# Patient Record
Sex: MALE | Race: WHITE | NOT HISPANIC OR LATINO | Employment: OTHER | URBAN - METROPOLITAN AREA
[De-identification: names, ages, dates, MRNs, and addresses within clinical notes are randomized per-mention and may not be internally consistent; named-entity substitution may affect disease eponyms.]

---

## 2019-08-02 ENCOUNTER — HOSPITAL ENCOUNTER (EMERGENCY)
Facility: HOSPITAL | Age: 49
Discharge: LEFT BEFORE DISCHARGE | End: 2019-08-02
Attending: EMERGENCY MEDICINE | Admitting: EMERGENCY MEDICINE
Payer: MEDICARE

## 2019-08-02 VITALS
WEIGHT: 150 LBS | RESPIRATION RATE: 16 BRPM | HEIGHT: 64 IN | TEMPERATURE: 97.6 F | BODY MASS INDEX: 25.61 KG/M2 | HEART RATE: 79 BPM | DIASTOLIC BLOOD PRESSURE: 73 MMHG | OXYGEN SATURATION: 94 % | SYSTOLIC BLOOD PRESSURE: 110 MMHG

## 2019-08-02 DIAGNOSIS — F10.10 ALCOHOL ABUSE: Primary | ICD-10-CM

## 2019-08-02 LAB — ETHANOL EXG-MCNC: 0.12 MG/DL

## 2019-08-02 PROCEDURE — 99284 EMERGENCY DEPT VISIT MOD MDM: CPT

## 2019-08-02 PROCEDURE — 82075 ASSAY OF BREATH ETHANOL: CPT | Performed by: EMERGENCY MEDICINE

## 2019-08-02 PROCEDURE — 99282 EMERGENCY DEPT VISIT SF MDM: CPT | Performed by: EMERGENCY MEDICINE

## 2019-08-02 NOTE — ED NOTES
Patient eloped from ED, patient left ambulatory and steady on his feet  Provider aware and saw patient leaving        [facility]  08/02/19 9872

## 2019-08-02 NOTE — ED PROVIDER NOTES
History  Chief Complaint   Patient presents with    Alcohol Intoxication     Brought in by EMS  Pt intoxicated states "I drank alot"  Pt smells of alcohol  Patient is a 78-year-old male with a history of alcohol abuse who presents via ambulance a requesting rehab  Patient states that he just left [facility] [place] the hospital approximately 3 days ago after a 2 day stay  States item self out early to go drinking  Has been drinking for last 3 days  Has been drinking malt liquor cannot tell me how much she had today  Now requesting rehab  Patient does not know how he ended up here in [facility] is originally from [place]  Denies any suicidal homicidal ideations nor any auditory visual hallucinations  No history of any mental health issues  Requesting rehab at this time  Denies any other medical complaints  None       History reviewed  No pertinent past medical history  History reviewed  No pertinent surgical history  History reviewed  No pertinent family history  I have reviewed and agree with the history as documented  Social History     Tobacco Use    Smoking status: Current Every Day Smoker     Packs/day: 1 00     Types: Cigarettes    Smokeless tobacco: Never Used   Substance Use Topics    Alcohol use: Yes    Drug use: Not on file     Comment: "I just drank"  Review of Systems   Constitutional: Negative  HENT: Negative  Eyes: Negative  Respiratory: Negative  Cardiovascular: Negative  Gastrointestinal: Negative  Endocrine: Negative  Genitourinary: Negative  Musculoskeletal: Negative  Skin: Negative  Allergic/Immunologic: Negative  Neurological: Negative  Hematological: Negative  Psychiatric/Behavioral: Negative  Negative for behavioral problems and dysphoric mood  All other systems reviewed and are negative  Physical Exam  Physical Exam   Constitutional: He is oriented to person, place, and time     Patient smells of alcohol   HENT: Head: Normocephalic and atraumatic  Right Ear: External ear normal    Left Ear: External ear normal    Nose: Nose normal    Mouth/Throat: Oropharynx is clear and moist    Patient without any swelling, tenderness to palpation, no septal hematoma, no hemotympanum, no orbital tenderness to palpation, no TMJ tenderness, no malocclusion  Eyes: Pupils are equal, round, and reactive to light  Conjunctivae are normal    Neck: Normal range of motion  Neck supple  Cardiovascular: Normal rate, regular rhythm, normal heart sounds and intact distal pulses  Pulmonary/Chest: Effort normal and breath sounds normal    Abdominal: Soft  Bowel sounds are normal    Musculoskeletal: Normal range of motion  Neurological: He is alert and oriented to person, place, and time  Skin: Skin is warm and dry  Psychiatric: His affect is labile  His speech is delayed and slurred  Nursing note and vitals reviewed        Vital Signs  ED Triage Vitals [08/02/19 1746]   Temperature Pulse Respirations Blood Pressure SpO2   97 6 °F (36 4 °C) 79 16 110/73 94 %      Temp Source Heart Rate Source Patient Position - Orthostatic VS BP Location FiO2 (%)   Tympanic Monitor Lying Left arm --      Pain Score       No Pain           Vitals:    08/02/19 1746   BP: 110/73   Pulse: 79   Patient Position - Orthostatic VS: Lying         Visual Acuity      ED Medications  Medications - No data to display    Diagnostic Studies  Results Reviewed     Procedure Component Value Units Date/Time    POCT alcohol breath test [[id number]]  (Normal) Resulted:  08/02/19 1800    Lab Status:  Edited Result - FINAL Updated:  08/02/19 1817     EXTBreath Alcohol 0 120    Rapid drug screen, urine [[id number]]     Lab Status:  No result Specimen:  Urine                  No orders to display              Procedures  Procedures       ED Course  ED Course as of Aug 02 2005   Fri Aug 02, 2019   1851 Patient took the opportunity to elope while myself and security were busy with another patient trying to elope from the ambulance doors  MDM  Number of Diagnoses or Management Options  Alcohol abuse:      Amount and/or Complexity of Data Reviewed  Tests in the medicine section of CPT®: ordered and reviewed  Review and summarize past medical records: yes        Disposition  Final diagnoses:   Alcohol abuse     Time reflects when diagnosis was documented in both MDM as applicable and the Disposition within this note     Time User Action Codes Description Comment    8/2/2019  6:52 PM Spencer Bang Add [F10 10] Alcohol abuse       ED Disposition     ED Disposition Condition Date/Time Comment    Left from Room after Provider Exam  Fri Aug 2, 2019  6:52 PM       Follow-up Information    None         There are no discharge medications for this patient  No discharge procedures on file      ED Provider  Electronically Signed by           Bharati Dempsey MD  08/02/19 2005

## 2019-08-03 ENCOUNTER — HOSPITAL ENCOUNTER (EMERGENCY)
Facility: HOSPITAL | Age: 49
End: 2019-08-04
Attending: EMERGENCY MEDICINE | Admitting: EMERGENCY MEDICINE
Payer: MEDICARE

## 2019-08-03 DIAGNOSIS — R45.851 SUICIDAL IDEATION: ICD-10-CM

## 2019-08-03 DIAGNOSIS — Z09 FOLLOW-UP EXAM: ICD-10-CM

## 2019-08-03 DIAGNOSIS — F10.929 ALCOHOL INTOXICATION (HCC): Primary | ICD-10-CM

## 2019-08-03 LAB
AMPHETAMINES SERPL QL SCN: NEGATIVE
BARBITURATES UR QL: NEGATIVE
BENZODIAZ UR QL: POSITIVE
COCAINE UR QL: POSITIVE
ETHANOL EXG-MCNC: 0.09 MG/DL
ETHANOL EXG-MCNC: 0.17 MG/DL
METHADONE UR QL: NEGATIVE
OPIATES UR QL SCN: NEGATIVE
PCP UR QL: NEGATIVE
THC UR QL: NEGATIVE

## 2019-08-03 PROCEDURE — 99285 EMERGENCY DEPT VISIT HI MDM: CPT

## 2019-08-03 PROCEDURE — 99285 EMERGENCY DEPT VISIT HI MDM: CPT | Performed by: PHYSICIAN ASSISTANT

## 2019-08-03 PROCEDURE — 82075 ASSAY OF BREATH ETHANOL: CPT | Performed by: EMERGENCY MEDICINE

## 2019-08-03 PROCEDURE — 82075 ASSAY OF BREATH ETHANOL: CPT | Performed by: PHYSICIAN ASSISTANT

## 2019-08-03 PROCEDURE — 80307 DRUG TEST PRSMV CHEM ANLYZR: CPT | Performed by: PHYSICIAN ASSISTANT

## 2019-08-03 RX ORDER — NICOTINE 21 MG/24HR
21 PATCH, TRANSDERMAL 24 HOURS TRANSDERMAL ONCE
Status: COMPLETED | OUTPATIENT
Start: 2019-08-03 | End: 2019-08-04

## 2019-08-03 RX ORDER — ACETAMINOPHEN 325 MG/1
650 TABLET ORAL EVERY 6 HOURS PRN
Status: CANCELLED | OUTPATIENT
Start: 2019-08-03

## 2019-08-03 RX ORDER — THIAMINE MONONITRATE (VIT B1) 100 MG
100 TABLET ORAL
COMMUNITY
End: 2019-08-07 | Stop reason: HOSPADM

## 2019-08-03 RX ORDER — BENZTROPINE MESYLATE 1 MG/ML
2 INJECTION INTRAMUSCULAR; INTRAVENOUS EVERY 6 HOURS PRN
Status: CANCELLED | OUTPATIENT
Start: 2019-08-03

## 2019-08-03 RX ORDER — HALOPERIDOL 5 MG/ML
5 INJECTION INTRAMUSCULAR EVERY 8 HOURS PRN
Status: CANCELLED | OUTPATIENT
Start: 2019-08-03

## 2019-08-03 RX ORDER — HYDROXYZINE HYDROCHLORIDE 25 MG/1
25 TABLET, FILM COATED ORAL EVERY 6 HOURS PRN
Status: CANCELLED | OUTPATIENT
Start: 2019-08-03

## 2019-08-03 RX ORDER — MAGNESIUM HYDROXIDE/ALUMINUM HYDROXICE/SIMETHICONE 120; 1200; 1200 MG/30ML; MG/30ML; MG/30ML
15 SUSPENSION ORAL EVERY 4 HOURS PRN
Status: CANCELLED | OUTPATIENT
Start: 2019-08-03

## 2019-08-03 RX ORDER — ACETAMINOPHEN 325 MG/1
650 TABLET ORAL EVERY 4 HOURS PRN
Status: CANCELLED | OUTPATIENT
Start: 2019-08-03

## 2019-08-03 RX ORDER — BENZTROPINE MESYLATE 0.5 MG/1
2 TABLET ORAL EVERY 6 HOURS PRN
Status: CANCELLED | OUTPATIENT
Start: 2019-08-03

## 2019-08-03 RX ORDER — LEVOTHYROXINE SODIUM 0.12 MG/1
125 TABLET ORAL
COMMUNITY

## 2019-08-03 RX ORDER — IBUPROFEN 400 MG/1
800 TABLET ORAL EVERY 8 HOURS PRN
Status: CANCELLED | OUTPATIENT
Start: 2019-08-03

## 2019-08-03 RX ORDER — ACETAMINOPHEN 325 MG/1
325 TABLET ORAL EVERY 6 HOURS PRN
Status: CANCELLED | OUTPATIENT
Start: 2019-08-03

## 2019-08-03 RX ORDER — LORAZEPAM 1 MG/1
2 TABLET ORAL EVERY 4 HOURS PRN
Status: CANCELLED | OUTPATIENT
Start: 2019-08-03

## 2019-08-03 RX ORDER — RISPERIDONE 1 MG/1
1 TABLET, ORALLY DISINTEGRATING ORAL EVERY 12 HOURS PRN
Status: CANCELLED | OUTPATIENT
Start: 2019-08-03

## 2019-08-03 RX ORDER — CLONIDINE HYDROCHLORIDE 0.1 MG/1
0.1 TABLET ORAL
COMMUNITY
End: 2019-08-07 | Stop reason: HOSPADM

## 2019-08-03 RX ORDER — TRAZODONE HYDROCHLORIDE 100 MG/1
100 TABLET ORAL
COMMUNITY
End: 2019-08-07 | Stop reason: HOSPADM

## 2019-08-03 RX ORDER — GABAPENTIN 600 MG/1
600 TABLET ORAL
COMMUNITY
End: 2019-08-07 | Stop reason: HOSPADM

## 2019-08-03 RX ORDER — HALOPERIDOL 5 MG
5 TABLET ORAL EVERY 8 HOURS PRN
Status: CANCELLED | OUTPATIENT
Start: 2019-08-03

## 2019-08-03 RX ORDER — LORAZEPAM 1 MG/1
2 TABLET ORAL EVERY 4 HOURS PRN
Status: DISCONTINUED | OUTPATIENT
Start: 2019-08-03 | End: 2019-08-04 | Stop reason: HOSPADM

## 2019-08-03 RX ORDER — LITHIUM CARBONATE 300 MG/1
300 TABLET, FILM COATED, EXTENDED RELEASE ORAL
COMMUNITY
End: 2019-08-07 | Stop reason: HOSPADM

## 2019-08-03 RX ORDER — HYDROXYZINE 50 MG/1
50 TABLET, FILM COATED ORAL
COMMUNITY
End: 2019-08-07 | Stop reason: HOSPADM

## 2019-08-03 RX ADMIN — NICOTINE 21 MG: 21 PATCH TRANSDERMAL at 15:39

## 2019-08-03 NOTE — ED NOTES
Assumed care of pt at this time pt is in hospital issued scrubs no personal belongings at bedside,is sitting up in bed eating pretzels and drinking beverage  1:1 monitoring continued by ED tech see paper charting             Isela Moy RN  08/03/19 5931

## 2019-08-03 NOTE — ED NOTES
Pt ambulated to restroom and back with unsteady gait and is now in hallway shouting that he want his belonging and wants to leave        Jorge Heller RN  08/03/19 6246

## 2019-08-03 NOTE — ED PROVIDER NOTES
History  Chief Complaint   Patient presents with    Alcohol Intoxication     Pt brought in by [employer] for evaluation  Pt appears intoxicated reports recently relapsed in his sobriety reports he was been drinking beer for the last 3 days  Pt requesting medication detox stating "when I detox its really bad " Pt calm and cooperative  During suicide screening pt reports SI with a plan to jump off a bridge     49y  o male with PMH of bipolar presents to the ER for alcohol intoxication  History if very limited due to alcohol intoxication  Patient states he is here for alcohol detox  Patient states he was just at [facility] last week  He states he has been drinking today  He admits to drinking beer about a 6 pack  He states he normally drinks more than a 6 pack  Patient states he does withdraw from alcohol and does have seizures  He admits to [facility] telling him to kill himself  Patient admits to [facility] with a plan to jump off a bridge  He denies VH or HI  He denies fever, chills, chest pain, dyspnea, N/V/D, abdominal pain, weakness or paresthesias  Patient denies drug use but does admit to smoking 1 pack of cigarettes per day           History provided by:  Patient   used: No        Prior to Admission Medications   Prescriptions Last Dose Informant Patient Reported? Taking?    cloNIDine (CATAPRES) 0 1 mg tablet   Yes No   Sig: Take 0 1 mg by mouth   gabapentin (NEURONTIN) 600 MG tablet   Yes No   Sig: Take 600 mg by mouth   hydrOXYzine HCL (ATARAX) 50 mg tablet   Yes No   Sig: Take 50 mg by mouth   levothyroxine 125 mcg tablet   Yes No   Sig: Take 125 mcg by mouth   lithium carbonate (LITHOBID) 300 mg CR tablet   Yes No   Sig: Take 300 mg by mouth   thiamine (VITAMIN B1) 100 mg tablet   Yes No   Sig: Take 100 mg by mouth   traZODone (DESYREL) 100 mg tablet   Yes No   Sig: Take 100 mg by mouth      Facility-Administered Medications: None       Past Medical History:   Diagnosis Date    Bipolar 1 disorder ([facility] [address] ) History reviewed  No pertinent surgical history  History reviewed  No pertinent family history  I have reviewed and agree with the history as documented  Social History     Tobacco Use    Smoking status: Current Every Day Smoker     Packs/day: 1 00     Types: Cigarettes    Smokeless tobacco: Never Used   Substance Use Topics    Alcohol use: Yes    Drug use: Never     Comment: "I just drank"  Review of Systems   Constitutional: Negative for chills and fever  Eyes: Negative for redness  Respiratory: Negative for shortness of breath  Cardiovascular: Negative for chest pain  Gastrointestinal: Negative for abdominal pain, diarrhea, nausea and vomiting  Musculoskeletal: Negative for neck stiffness  Skin: Negative for rash  Allergic/Immunologic: Negative for food allergies  Neurological: Negative for weakness and numbness  Psychiatric/Behavioral: Positive for suicidal ideas  Physical Exam  Physical Exam   Constitutional: He is active  Non-toxic appearance  No distress  HENT:   Head: Normocephalic and atraumatic  Neck: Normal range of motion  Neck supple  No tracheal deviation present  Cardiovascular: Normal rate, regular rhythm, S1 normal, S2 normal and normal heart sounds  Exam reveals no gallop and no friction rub  No murmur heard  Pulmonary/Chest: Effort normal and breath sounds normal  No respiratory distress  He has no decreased breath sounds  He has no wheezes  He has no rhonchi  He has no rales  He exhibits no tenderness  Abdominal: Soft  Bowel sounds are normal  He exhibits no distension  There is no tenderness  There is no rebound and no guarding  Neurological: He is alert  GCS eye subscore is 4  GCS verbal subscore is 5  GCS motor subscore is 6  Skin: Skin is warm and dry  No rash noted  Psychiatric: He has a normal mood and affect  His behavior is normal  His speech is slurred (alcohol intoxication)  He expresses suicidal ideation   He expresses no homicidal ideation  Nursing note and vitals reviewed  Vital Signs  ED Triage Vitals   Temperature Pulse Respirations Blood Pressure SpO2   08/03/19 1417 08/03/19 1417 08/03/19 1417 08/03/19 1417 08/03/19 1417   98 4 °F (36 9 °C) 63 18 122/86 96 %      Temp Source Heart Rate Source Patient Position - Orthostatic VS BP Location FiO2 (%)   08/03/19 1417 08/03/19 1417 08/03/19 1833 08/03/19 1417 --   Oral Monitor Sitting Right arm       Pain Score       08/03/19 1417       No Pain           Vitals:    08/03/19 1417 08/03/19 1833 08/03/19 2117 08/04/19 0230   BP: 122/86 102/71 104/64 119/81   Pulse: 63 76 70 68   Patient Position - Orthostatic VS:  Sitting Lying Lying         Visual Acuity      ED Medications  Medications   nicotine (NICODERM CQ) 21 mg/24 hr TD 24 hr patch 21 mg (21 mg Transdermal Medication Applied 8/3/19 1539)   LORazepam (ATIVAN) tablet 2 mg (has no administration in time range)       Diagnostic Studies  Results Reviewed     Procedure Component Value Units Date/Time    POCT alcohol breath test [[id number]]  (Normal) Resulted:  08/03/19 1843    Lab Status:  Final result Updated:  08/03/19 1843     EXTBreath Alcohol 0 087    Rapid drug screen, urine [[id number]]  (Abnormal) Collected:  08/03/19 1553    Lab Status:  Final result Specimen:  Urine, Clean Catch Updated:  08/03/19 1622     Amph/Meth UR Negative     Barbiturate Ur Negative     Benzodiazepine Urine Positive     Cocaine Urine Positive     Methadone Urine Negative     Opiate Urine Negative     PCP Ur Negative     THC Urine Negative    Narrative:       Presumptive report  If requested, specimen will be sent to reference lab for confirmation  FOR MEDICAL PURPOSES ONLY  IF CONFIRMATION NEEDED PLEASE CONTACT THE LAB WITHIN 5 DAYS      Drug Screen Cutoff Levels:  AMPHETAMINE/METHAMPHETAMINES  1000 ng/mL  BARBITURATES     200 ng/mL  BENZODIAZEPINES     200 ng/mL  COCAINE      300 ng/mL  METHADONE      300 ng/mL  OPIATES      300 ng/mL  PHENCYCLIDINE     25 ng/mL  THC       50 ng/mL      POCT alcohol breath test [[id number]]  (Normal) Resulted:  08/03/19 1441    Lab Status:  Final result Updated:  08/03/19 1441     EXTBreath Alcohol 0 165                 No orders to display              Procedures  Procedures       ED Course                               MDM  Number of Diagnoses or Management Options  Alcohol intoxication ([facility] [address] ): new and requires workup  Suicidal ideation: new and requires workup  Diagnosis management comments: DDX consists of but not limited to: detox, alcohol intoxication, suicidal, bipolar    Will check BAT    BAT is  165 and possibly rising  Will recheck and reassess  Patient signed out to Dr Gala Campo awaiting to be seen by Crisis after he is sober  Patient stable at this time         Amount and/or Complexity of Data Reviewed  Clinical lab tests: ordered and reviewed  Discuss the patient with other providers: yes    Patient Progress  Patient progress: stable      Disposition  Final diagnoses:   Alcohol intoxication ([address] )   Suicidal ideation     Time reflects when diagnosis was documented in both MDM as applicable and the Disposition within this note     Time User Action Codes Description Comment    8/3/2019 10:02 PM Gwenette Meals Add [F10 929] Alcohol intoxication ([facility] [address] )     8/3/2019 10:02 PM Gwenette Meals Add [P23 900] Suicidal ideation     8/3/2019 11:21 PM [facility], [facility] [B27] Follow-up exam       ED Disposition     None      MD Documentation      Most Recent Value   Accepting Physician  Dr Nia Milian Name, [address]    (Name & Tel number)  Howard Macedo   Transported by [employer] and Unit #)  Cisco Andrews   [phone]   Sending MD Dr Charlie Mart[facility]nez Name, [facility] [address] [place] [address]    (Name & Tel number)  Cody Moody   Transport Mode  Ambulance   [facility] Unit [facility])  Cisco Andrews   [phone] Level of Care  Basic life support   Patient Belongings Disposition  Sent with patient   Transfer Date  08/04/19   Transfer Time  1543      Follow-up Information    None         Patient's Medications   Discharge Prescriptions    No medications on file     No discharge procedures on file      ED Provider  Electronically Signed by           Zonia Yates PA-C  08/04/19 2721

## 2019-08-04 ENCOUNTER — HOSPITAL ENCOUNTER (INPATIENT)
Facility: HOSPITAL | Age: 49
LOS: 3 days | Discharge: LEFT BEFORE DISCHARGE | DRG: 885 | End: 2019-08-07
Attending: PSYCHIATRY & NEUROLOGY | Admitting: PSYCHIATRY & NEUROLOGY
Payer: MEDICARE

## 2019-08-04 VITALS
OXYGEN SATURATION: 96 % | TEMPERATURE: 98.5 F | RESPIRATION RATE: 16 BRPM | DIASTOLIC BLOOD PRESSURE: 86 MMHG | SYSTOLIC BLOOD PRESSURE: 131 MMHG | HEART RATE: 50 BPM

## 2019-08-04 DIAGNOSIS — F10.929 ALCOHOL INTOXICATION (HCC): ICD-10-CM

## 2019-08-04 DIAGNOSIS — F31.9 BIPOLAR DISORDER (HCC): Primary | ICD-10-CM

## 2019-08-04 DIAGNOSIS — R45.851 SUICIDAL IDEATION: ICD-10-CM

## 2019-08-04 RX ORDER — ACETAMINOPHEN 325 MG/1
975 TABLET ORAL EVERY 6 HOURS PRN
Status: DISCONTINUED | OUTPATIENT
Start: 2019-08-04 | End: 2019-08-07 | Stop reason: HOSPADM

## 2019-08-04 RX ORDER — ACETAMINOPHEN 325 MG/1
650 TABLET ORAL EVERY 6 HOURS PRN
Status: DISCONTINUED | OUTPATIENT
Start: 2019-08-04 | End: 2019-08-07 | Stop reason: HOSPADM

## 2019-08-04 RX ORDER — ACETAMINOPHEN 325 MG/1
650 TABLET ORAL EVERY 4 HOURS PRN
Status: CANCELLED | OUTPATIENT
Start: 2019-08-04

## 2019-08-04 RX ORDER — THIAMINE MONONITRATE (VIT B1) 100 MG
100 TABLET ORAL
Status: DISCONTINUED | OUTPATIENT
Start: 2019-08-04 | End: 2019-08-07 | Stop reason: HOSPADM

## 2019-08-04 RX ORDER — ONDANSETRON 4 MG/1
4 TABLET, ORALLY DISINTEGRATING ORAL EVERY 6 HOURS PRN
Status: DISCONTINUED | OUTPATIENT
Start: 2019-08-04 | End: 2019-08-07 | Stop reason: HOSPADM

## 2019-08-04 RX ORDER — FOLIC ACID 1 MG/1
1 TABLET ORAL DAILY
Status: CANCELLED | OUTPATIENT
Start: 2019-08-04

## 2019-08-04 RX ORDER — HALOPERIDOL 5 MG/ML
5 INJECTION INTRAMUSCULAR EVERY 6 HOURS PRN
Status: CANCELLED | OUTPATIENT
Start: 2019-08-04

## 2019-08-04 RX ORDER — HYDROXYZINE HYDROCHLORIDE 25 MG/1
25 TABLET, FILM COATED ORAL EVERY 6 HOURS PRN
Status: CANCELLED | OUTPATIENT
Start: 2019-08-04

## 2019-08-04 RX ORDER — RISPERIDONE 1 MG/1
1 TABLET, ORALLY DISINTEGRATING ORAL
Status: CANCELLED | OUTPATIENT
Start: 2019-08-04

## 2019-08-04 RX ORDER — HYDROXYZINE HYDROCHLORIDE 25 MG/1
25 TABLET, FILM COATED ORAL ONCE
Status: COMPLETED | OUTPATIENT
Start: 2019-08-04 | End: 2019-08-04

## 2019-08-04 RX ORDER — TRAZODONE HYDROCHLORIDE 50 MG/1
50 TABLET ORAL
Status: CANCELLED | OUTPATIENT
Start: 2019-08-04

## 2019-08-04 RX ORDER — NICOTINE 21 MG/24HR
1 PATCH, TRANSDERMAL 24 HOURS TRANSDERMAL DAILY
Status: DISCONTINUED | OUTPATIENT
Start: 2019-08-05 | End: 2019-08-07 | Stop reason: HOSPADM

## 2019-08-04 RX ORDER — LORAZEPAM 2 MG/ML
2 INJECTION INTRAMUSCULAR EVERY 6 HOURS PRN
Status: DISCONTINUED | OUTPATIENT
Start: 2019-08-04 | End: 2019-08-06

## 2019-08-04 RX ORDER — ACETAMINOPHEN 325 MG/1
975 TABLET ORAL EVERY 6 HOURS PRN
Status: CANCELLED | OUTPATIENT
Start: 2019-08-04

## 2019-08-04 RX ORDER — HALOPERIDOL 5 MG
5 TABLET ORAL EVERY 6 HOURS PRN
Status: DISCONTINUED | OUTPATIENT
Start: 2019-08-04 | End: 2019-08-07 | Stop reason: HOSPADM

## 2019-08-04 RX ORDER — MAGNESIUM HYDROXIDE/ALUMINUM HYDROXICE/SIMETHICONE 120; 1200; 1200 MG/30ML; MG/30ML; MG/30ML
30 SUSPENSION ORAL EVERY 4 HOURS PRN
Status: DISCONTINUED | OUTPATIENT
Start: 2019-08-04 | End: 2019-08-07 | Stop reason: HOSPADM

## 2019-08-04 RX ORDER — LORAZEPAM 1 MG/1
2 TABLET ORAL ONCE
Status: COMPLETED | OUTPATIENT
Start: 2019-08-04 | End: 2019-08-04

## 2019-08-04 RX ORDER — RISPERIDONE 1 MG/1
1 TABLET, ORALLY DISINTEGRATING ORAL
Status: DISCONTINUED | OUTPATIENT
Start: 2019-08-04 | End: 2019-08-07 | Stop reason: HOSPADM

## 2019-08-04 RX ORDER — ACETAMINOPHEN 325 MG/1
650 TABLET ORAL EVERY 6 HOURS PRN
Status: CANCELLED | OUTPATIENT
Start: 2019-08-04

## 2019-08-04 RX ORDER — HYDROXYZINE HYDROCHLORIDE 25 MG/1
50 TABLET, FILM COATED ORAL EVERY 4 HOURS PRN
Status: DISCONTINUED | OUTPATIENT
Start: 2019-08-04 | End: 2019-08-07 | Stop reason: HOSPADM

## 2019-08-04 RX ORDER — THIAMINE MONONITRATE (VIT B1) 100 MG
100 TABLET ORAL
Status: CANCELLED | OUTPATIENT
Start: 2019-08-04

## 2019-08-04 RX ORDER — ONDANSETRON 4 MG/1
4 TABLET, ORALLY DISINTEGRATING ORAL ONCE
Status: COMPLETED | OUTPATIENT
Start: 2019-08-04 | End: 2019-08-04

## 2019-08-04 RX ORDER — HYDROXYZINE HYDROCHLORIDE 25 MG/1
50 TABLET, FILM COATED ORAL EVERY 4 HOURS PRN
Status: CANCELLED | OUTPATIENT
Start: 2019-08-04

## 2019-08-04 RX ORDER — HALOPERIDOL 5 MG
5 TABLET ORAL EVERY 6 HOURS PRN
Status: CANCELLED | OUTPATIENT
Start: 2019-08-04

## 2019-08-04 RX ORDER — LORAZEPAM 2 MG/ML
2 INJECTION INTRAMUSCULAR EVERY 6 HOURS PRN
Status: CANCELLED | OUTPATIENT
Start: 2019-08-04

## 2019-08-04 RX ORDER — GABAPENTIN 300 MG/1
600 CAPSULE ORAL 3 TIMES DAILY
Status: DISCONTINUED | OUTPATIENT
Start: 2019-08-04 | End: 2019-08-06

## 2019-08-04 RX ORDER — LORAZEPAM 1 MG/1
1 TABLET ORAL EVERY 4 HOURS PRN
Status: CANCELLED | OUTPATIENT
Start: 2019-08-04

## 2019-08-04 RX ORDER — FOLIC ACID 1 MG/1
1 TABLET ORAL DAILY
Status: DISCONTINUED | OUTPATIENT
Start: 2019-08-04 | End: 2019-08-07 | Stop reason: HOSPADM

## 2019-08-04 RX ORDER — GABAPENTIN 300 MG/1
600 CAPSULE ORAL 3 TIMES DAILY
Status: CANCELLED | OUTPATIENT
Start: 2019-08-04

## 2019-08-04 RX ORDER — MAGNESIUM HYDROXIDE/ALUMINUM HYDROXICE/SIMETHICONE 120; 1200; 1200 MG/30ML; MG/30ML; MG/30ML
30 SUSPENSION ORAL EVERY 4 HOURS PRN
Status: CANCELLED | OUTPATIENT
Start: 2019-08-04

## 2019-08-04 RX ORDER — HYDROXYZINE HYDROCHLORIDE 25 MG/1
25 TABLET, FILM COATED ORAL EVERY 6 HOURS PRN
Status: DISCONTINUED | OUTPATIENT
Start: 2019-08-04 | End: 2019-08-07 | Stop reason: HOSPADM

## 2019-08-04 RX ORDER — ACETAMINOPHEN 325 MG/1
650 TABLET ORAL EVERY 4 HOURS PRN
Status: DISCONTINUED | OUTPATIENT
Start: 2019-08-04 | End: 2019-08-07 | Stop reason: HOSPADM

## 2019-08-04 RX ORDER — OLANZAPINE 10 MG/1
10 INJECTION, POWDER, LYOPHILIZED, FOR SOLUTION INTRAMUSCULAR
Status: DISCONTINUED | OUTPATIENT
Start: 2019-08-04 | End: 2019-08-07 | Stop reason: HOSPADM

## 2019-08-04 RX ORDER — HALOPERIDOL 5 MG/ML
5 INJECTION INTRAMUSCULAR EVERY 6 HOURS PRN
Status: DISCONTINUED | OUTPATIENT
Start: 2019-08-04 | End: 2019-08-07 | Stop reason: HOSPADM

## 2019-08-04 RX ORDER — TRAZODONE HYDROCHLORIDE 50 MG/1
50 TABLET ORAL
Status: DISCONTINUED | OUTPATIENT
Start: 2019-08-04 | End: 2019-08-07 | Stop reason: HOSPADM

## 2019-08-04 RX ORDER — OLANZAPINE 10 MG/1
10 INJECTION, POWDER, LYOPHILIZED, FOR SOLUTION INTRAMUSCULAR
Status: CANCELLED | OUTPATIENT
Start: 2019-08-04

## 2019-08-04 RX ORDER — LORAZEPAM 1 MG/1
1 TABLET ORAL EVERY 4 HOURS PRN
Status: DISCONTINUED | OUTPATIENT
Start: 2019-08-04 | End: 2019-08-07 | Stop reason: HOSPADM

## 2019-08-04 RX ORDER — NICOTINE 21 MG/24HR
1 PATCH, TRANSDERMAL 24 HOURS TRANSDERMAL DAILY
Status: CANCELLED | OUTPATIENT
Start: 2019-08-04

## 2019-08-04 RX ADMIN — FOLIC ACID 1 MG: 1 TABLET ORAL at 17:00

## 2019-08-04 RX ADMIN — LORAZEPAM 2 MG: 1 TABLET ORAL at 12:09

## 2019-08-04 RX ADMIN — Medication 1 TABLET: at 17:00

## 2019-08-04 RX ADMIN — GABAPENTIN 600 MG: 300 CAPSULE ORAL at 16:59

## 2019-08-04 RX ADMIN — ONDANSETRON 4 MG: 4 TABLET, ORALLY DISINTEGRATING ORAL at 10:19

## 2019-08-04 RX ADMIN — ONDANSETRON 4 MG: 4 TABLET, ORALLY DISINTEGRATING ORAL at 21:29

## 2019-08-04 RX ADMIN — Medication 100 MG: at 20:49

## 2019-08-04 RX ADMIN — GABAPENTIN 600 MG: 300 CAPSULE ORAL at 20:49

## 2019-08-04 RX ADMIN — LORAZEPAM 1 MG: 1 TABLET ORAL at 16:59

## 2019-08-04 RX ADMIN — LORAZEPAM 2 MG: 2 INJECTION INTRAMUSCULAR; INTRAVENOUS at 20:57

## 2019-08-04 RX ADMIN — HYDROXYZINE HYDROCHLORIDE 25 MG: 25 TABLET ORAL at 10:19

## 2019-08-04 NOTE — PROGRESS NOTES
Applied 21 mg nicotine patch  Administered alcohol withdrawal protocol medications as prescribed  Pt states, "need something more for my withdrawals here I have seizure history" Administered ativan 1 mg po prn as prescribed  Pt irritable and agitates easily  Uncooperative with care  Refusing to participate in admission promise  Demanding to leave stating "I was drunk" that's only reason signed myself in  72 hour notice signed 8/4/19 at [phone]

## 2019-08-04 NOTE — ED NOTES
Insurance Authorization for admission:   Phone call placed to [facility]  Phone number: [phone]  Spoke to Hudson doherty  [facility] to fax paperwork upon discharge  EVS (Eligibility Verification System) called - [phone]  Automated system indicates: Medicare primary, [facility] secondary    Insurance Authorization for Transportation:    Not required for FARAZ Gloria  08/03/19   0702
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
## 2019-08-04 NOTE — ED NOTES
Patient is accepted at [facility]  Patient is accepted by Dr Sheree Joseph per Paula Warren in Intake  Transportation is arranged with [facility]  Transportation is scheduled for 1545  Patient may go to the floor at anytime  *Nurse report is to be called to [phone] prior to patient transfer  FARAZ Penny  08/03/19     0556

## 2019-08-04 NOTE — ED NOTES
RN called to pt's room  Pt requesting "something" for anxiety  Upon RN's arrival to pt's room, pt noted to have a tremoring hand while the hand was position on the side rail of the litter  During same interaction with pt, while speaking to pt about plan of care, pt's tremoring noted to cease  Pt offered breakfast tray again, but pt refused, stating that he "wouldn't be able to keep anything down " Pt reports that he is nauseous        Sherryle Court, RN  08/04/19 1017

## 2019-08-04 NOTE — ED CARE HANDOFF
Emergency Department Sign Out Note        Sign out and transfer of care from [facility]  See Separate Emergency Department note  The patient, Jose E Chapman, was evaluated by the previous provider for Alcohol intoxication  Workup Completed:  Alcohol level, drug screen  ED Course / Workup Pending (followup):  Please see detailed note by PAOLA SCHWAB and note below  ED Course as of Aug 04 1539   Sat Aug 03, 2019   2159 Blood Pressure: 104/64   2159 Pulse: 70   2159 Patient clinically sober and medically stable for inpatient psychiatric treatment  Signed 201  Respirations: 20     Procedures  MDM    Disposition  Final diagnoses:   Alcohol intoxication ([facility] [address] )   Suicidal ideation     Time reflects when diagnosis was documented in both MDM as applicable and the Disposition within this note     Time User Action Codes Description Comment    8/3/2019 10:02 PM Sundar Fabian Add [F10 929] Alcohol intoxication ([facility] [address] )     8/3/2019 10:02 PM Sundar Fabian Add [Z57 814] Suicidal ideation     8/3/2019 11:21 PM Reyne Riding Add [K59] Follow-up exam       ED Disposition     ED Disposition Condition Date/Time Comment    [place] [facility] [place] 4, 2019  3:07 PM Jose E Chapman should be transferred out to [facility] and has been medically cleared          MD Documentation      Most Recent Value   Patient Condition  The patient has been stabilized such that within reasonable medical probability, no material deterioration of the patient condition or the condition of the unborn child(nataliia) is likely to result from the transfer, Other (Include comment)_________________________________________ [psychiatric transfer]   Reason for Transfer  Level of Care needed not available at this facility, No bed available at level of patient's needs   Benefits of Transfer  Specialized equipment and/or services available at the receiving facility (Include comment)________________________, Continuity of care, Other benefits (Include comment)_______________________ [psychiatric transfer for continuity of care]   Risks of Transfer  Potential for delay in receiving treatment, Increased discomfort during transfer   Accepting Physician  Dr [address] Name, Severo Stevensva    (Name & Tel number)  Inessa Parent (Crisis)   Transported by (Company and Unit #)  [employer]   Sending MD Dr Leopold Dominion   Provider Certification  The patient is stable for psychiatric transfer because they are medically stable, and is protected from harming him/herself or others during transport      RN Documentation      [address] Name, Severo Diggs    (Name & Tel number)  Inessa Parent (Crisis)   Transport Mode  Ambulance   Transported by (Company and Unit #)  [employer]   Level of Care  Basic life support   Patient Belongings Disposition  Sent with patient   Transfer Date  08/04/19   Transfer Time  1545      Follow-up Information    None       Patient's Medications   Discharge Prescriptions    No medications on file     No discharge procedures on file         ED Provider  Electronically Signed by     Rebecca Price MD  08/04/19 26 [id number]

## 2019-08-04 NOTE — ED NOTES
Dr Mihai Dang aware of pt's complaints  Pt medicated as ordered/charted       Reno Ramírez, RN  08/04/19 2685

## 2019-08-04 NOTE — ED NOTES
Pt states "I just want to run and leave here " Informed pt he cannot do that because he signed a 201  Pt verbalized understanding   Security at pts bedside      Zander New RN  08/04/19 4763
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
## 2019-08-04 NOTE — ED NOTES
Pt requested soda, given  Tremors noted briefly in hand when holding cup  Pt denies AH/VH at this time  States "no but it's coming " pt laying still, resting in bed at this time        Zully Patiño, SOLITARIO  08/04/19 2242

## 2019-08-04 NOTE — ED NOTES
Pt is currently resting in bed   Tech Roselyn at bed side for 1:1     Florencio Jeans, SOLITARIO  08/04/19 7623

## 2019-08-04 NOTE — ED NOTES
Lithium bottle placed in valuables bag [id number] [facility] [address], RN  08/04/19 1862

## 2019-08-04 NOTE — ED NOTES
Pt states that he has not taken his daily medications "in a week or two " Dr Haylee Mondragon aware of same  No new medication orders at this time       Taylor Wang RN  08/04/19 5685

## 2019-08-04 NOTE — EMTALA/ACUTE CARE TRANSFER
Purific[facility]placed by [facility]  [address] [address]  Dept: [phone]      EMTALA TRANSFER CONSENT    NAME Tessa Taylor                                         1970                              MRN [id number]    I have been informed of my rights regarding examination, treatment, and transfer   by Dr Rebecca Smith, *    Benefits: Specialized equipment and/or services available at the receiving facility (Include comment)________________________, Continuity of care, Other benefits (Include comment)_______________________(psychiatric transfer for continuity of care)    Risks: Potential for delay in receiving treatment, Increased discomfort during transfer      Consent for Transfer:  I acknowledge that my medical condition has been evaluated and explained to me by the emergency department physician or other qualified medical person and/or my attending physician, who has recommended that I be transferred to the service of  Accepting Physician: Dr Koby Dukes at [facility] Name, [address] : Ruben Paredes  The above potential benefits of such transfer, the potential risks associated with such transfer, and the probable risks of not being transferred have been explained to me, and I fully understand them  The doctor has explained that, in my case, the benefits of transfer outweigh the risks  I agree to be transferred  I authorize the performance of emergency medical procedures and treatments upon me in both transit and upon arrival at the receiving facility  Additionally, I authorize the release of any and all medical records to the receiving facility and request they be transported with me, if possible  I understand that the safest mode of transportation during a medical emergency is an ambulance and that the Hospital advocates the use of this mode of transport   Risks of traveling to the receiving facility by car, including absence of medical control, life sustaining equipment, such as oxygen, and medical personnel has been explained to me and I fully understand them  (KATIANA CORRECT BOX BELOW)  [ X ]  I consent to the stated transfer and to be transported by ambulance/helicopter  [  ]  I consent to the stated transfer, but refuse transportation by ambulance and accept full responsibility for my transportation by car  I understand the risks of non-ambulance transfers and I exonerate the Hospital and its staff from any deterioration in my condition that results from this refusal     X___________________________________________    DATE  19  TIME________  Signature of patient or legally responsible individual signing on patient behalf           RELATIONSHIP TO PATIENT_________________________          Provider Certification    NAME Ebenezer Perez                                         1970                              MRN [id number]    A medical screening exam was performed on the above named patient  Based on the examination:    Condition Necessitating Transfer The primary encounter diagnosis was Alcohol intoxication ([facility] [address] )  Diagnoses of Suicidal ideation and Follow-up exam were also pertinent to this visit      Patient Condition: The patient has been stabilized such that within reasonable medical probability, no material deterioration of the patient condition or the condition of the unborn child(nataliia) is likely to result from the transfer, Other (Include comment)_________________________________________(psychiatric transfer)    Reason for Transfer: Level of Care needed not available at this facility, No bed available at level of patient's needs    Transfer Requirements: [facility]   · Space available and qualified personnel available for treatment as acknowledged by JUAN PABLO Davis [address] (Crisis)  · Agreed to accept transfer and to provide appropriate medical treatment as acknowledged by       Dr Tosha Jurado  · Appropriate medical records of the examination and treatment of the patient are provided at the time of transfer   [address] _______  · Transfer will be performed by qualified personnel from [facility]  and appropriate transfer equipment as required, including the use of necessary and appropriate life support measures  Provider Certification: I have examined the patient and explained the following risks and benefits of being transferred/refusing transfer to the patient/family:  The patient is stable for psychiatric transfer because they are medically stable, and is protected from harming him/herself or others during transport      Based on these reasonable risks and benefits to the patient and/or the unborn child(nataliia), and based upon the information available at the time of the patients examination, I certify that the medical benefits reasonably to be expected from the provision of appropriate medical treatments at another medical facility outweigh the increasing risks, if any, to the individuals medical condition, and in the case of labor to the unborn child, from effecting the transfer      X____________________________________________ DATE 08/04/19        TIME_______      ORIGINAL - SEND TO MEDICAL RECORDS   COPY - SEND WITH PATIENT DURING TRANSFER

## 2019-08-04 NOTE — ED NOTES
RN called to pt's room, where pt states that he wants to sign out  Pt made aware that 201 has been signed and he will not be able to leave this facility until the transport team arrives and he is transferred to [facility]  Pt agreeable to plan  Pt given breakfast tray as requested       Taylor Wang RN  08/04/19 6886

## 2019-08-05 PROBLEM — R79.89 ELEVATED LFTS: Status: ACTIVE | Noted: 2019-08-05

## 2019-08-05 PROBLEM — E03.8 OTHER SPECIFIED HYPOTHYROIDISM: Status: ACTIVE | Noted: 2019-08-05

## 2019-08-05 PROBLEM — F31.9 BIPOLAR DISORDER (HCC): Status: ACTIVE | Noted: 2019-08-05

## 2019-08-05 PROBLEM — F17.200 CURRENT SMOKER: Status: ACTIVE | Noted: 2019-08-05

## 2019-08-05 PROBLEM — F10.231 ALCOHOL WITHDRAWAL SYNDROME, WITH DELIRIUM (HCC): Status: ACTIVE | Noted: 2019-08-05

## 2019-08-05 PROBLEM — R00.1 BRADYCARDIA: Status: ACTIVE | Noted: 2019-08-05

## 2019-08-05 LAB
ALBUMIN SERPL BCP-MCNC: 3.6 G/DL (ref 3.5–5.7)
ALP SERPL-CCNC: 33 U/L (ref 40–150)
ALT SERPL W P-5'-P-CCNC: 70 U/L (ref 7–52)
ANION GAP SERPL CALCULATED.3IONS-SCNC: 2 MMOL/L (ref 4–13)
AST SERPL W P-5'-P-CCNC: 57 U/L (ref 13–39)
ATRIAL RATE: 48 BPM
BASOPHILS # BLD AUTO: 0 THOUSANDS/ΜL (ref 0–0.1)
BASOPHILS NFR BLD AUTO: 1 % (ref 0–2)
BILIRUB SERPL-MCNC: 0.4 MG/DL (ref 0.2–1)
BUN SERPL-MCNC: 16 MG/DL (ref 7–25)
CALCIUM SERPL-MCNC: 9.1 MG/DL (ref 8.6–10.5)
CHLORIDE SERPL-SCNC: 109 MMOL/L (ref 98–107)
CHOLEST SERPL-MCNC: 153 MG/DL (ref 0–200)
CO2 SERPL-SCNC: 26 MMOL/L (ref 21–31)
CREAT SERPL-MCNC: 0.79 MG/DL (ref 0.7–1.3)
EOSINOPHIL # BLD AUTO: 0.2 THOUSAND/ΜL (ref 0–0.61)
EOSINOPHIL NFR BLD AUTO: 4 % (ref 0–5)
ERYTHROCYTE [DISTWIDTH] IN BLOOD BY AUTOMATED COUNT: 13.3 % (ref 11.5–14.5)
GFR SERPL CREATININE-BSD FRML MDRD: 104 ML/MIN/1.73SQ M
GLUCOSE P FAST SERPL-MCNC: 103 MG/DL (ref 65–99)
GLUCOSE SERPL-MCNC: 103 MG/DL (ref 65–99)
HCT VFR BLD AUTO: 44.7 % (ref 42–47)
HDLC SERPL-MCNC: 40 MG/DL (ref 40–60)
HGB BLD-MCNC: 15.5 G/DL (ref 14–18)
LDLC SERPL CALC-MCNC: 74 MG/DL (ref 0–100)
LYMPHOCYTES # BLD AUTO: 2.8 THOUSANDS/ΜL (ref 0.6–4.47)
LYMPHOCYTES NFR BLD AUTO: 43 % (ref 21–51)
MCH RBC QN AUTO: 33.9 PG (ref 26–34)
MCHC RBC AUTO-ENTMCNC: 34.6 G/DL (ref 31–37)
MCV RBC AUTO: 98 FL (ref 81–99)
MONOCYTES # BLD AUTO: 0.6 THOUSAND/ΜL (ref 0.17–1.22)
MONOCYTES NFR BLD AUTO: 9 % (ref 2–12)
NEUTROPHILS # BLD AUTO: 2.9 THOUSANDS/ΜL (ref 1.4–6.5)
NEUTS SEG NFR BLD AUTO: 44 % (ref 42–75)
NONHDLC SERPL-MCNC: 113 MG/DL
P AXIS: 68 DEGREES
PLATELET # BLD AUTO: 127 THOUSANDS/UL (ref 149–390)
PMV BLD AUTO: 9 FL (ref 8.6–11.7)
POTASSIUM SERPL-SCNC: 3.8 MMOL/L (ref 3.5–5.5)
PR INTERVAL: 152 MS
PROT SERPL-MCNC: 6.8 G/DL (ref 6.4–8.9)
QRS AXIS: 38 DEGREES
QRSD INTERVAL: 98 MS
QT INTERVAL: 448 MS
QTC INTERVAL: 400 MS
RBC # BLD AUTO: 4.56 MILLION/UL (ref 4.3–5.9)
RPR SER QL: NORMAL
SODIUM SERPL-SCNC: 137 MMOL/L (ref 134–143)
T WAVE AXIS: 49 DEGREES
TRIGL SERPL-MCNC: 195 MG/DL (ref 44–166)
TSH SERPL DL<=0.05 MIU/L-ACNC: 1.83 UIU/ML (ref 0.45–5.33)
VENTRICULAR RATE: 48 BPM
WBC # BLD AUTO: 6.6 THOUSAND/UL (ref 4.8–10.8)

## 2019-08-05 PROCEDURE — 99223 1ST HOSP IP/OBS HIGH 75: CPT | Performed by: INTERNAL MEDICINE

## 2019-08-05 PROCEDURE — 93010 ELECTROCARDIOGRAM REPORT: CPT | Performed by: INTERNAL MEDICINE

## 2019-08-05 PROCEDURE — 99222 1ST HOSP IP/OBS MODERATE 55: CPT | Performed by: PSYCHIATRY & NEUROLOGY

## 2019-08-05 PROCEDURE — 84443 ASSAY THYROID STIM HORMONE: CPT | Performed by: NURSE PRACTITIONER

## 2019-08-05 PROCEDURE — 86592 SYPHILIS TEST NON-TREP QUAL: CPT | Performed by: NURSE PRACTITIONER

## 2019-08-05 PROCEDURE — 93005 ELECTROCARDIOGRAM TRACING: CPT

## 2019-08-05 PROCEDURE — 85025 COMPLETE CBC W/AUTO DIFF WBC: CPT | Performed by: NURSE PRACTITIONER

## 2019-08-05 PROCEDURE — 80053 COMPREHEN METABOLIC PANEL: CPT | Performed by: NURSE PRACTITIONER

## 2019-08-05 PROCEDURE — 80061 LIPID PANEL: CPT | Performed by: NURSE PRACTITIONER

## 2019-08-05 RX ORDER — CHLORDIAZEPOXIDE HYDROCHLORIDE 25 MG/1
50 CAPSULE, GELATIN COATED ORAL 3 TIMES DAILY
Status: DISCONTINUED | OUTPATIENT
Start: 2019-08-05 | End: 2019-08-06

## 2019-08-05 RX ORDER — LITHIUM CARBONATE 450 MG
450 TABLET, EXTENDED RELEASE ORAL EVERY 12 HOURS SCHEDULED
Status: DISCONTINUED | OUTPATIENT
Start: 2019-08-05 | End: 2019-08-07 | Stop reason: HOSPADM

## 2019-08-05 RX ORDER — LORAZEPAM 1 MG/1
2 TABLET ORAL EVERY 6 HOURS PRN
Status: DISCONTINUED | OUTPATIENT
Start: 2019-08-05 | End: 2019-08-06

## 2019-08-05 RX ADMIN — CHLORDIAZEPOXIDE HYDROCHLORIDE 50 MG: 25 CAPSULE ORAL at 21:43

## 2019-08-05 RX ADMIN — CHLORDIAZEPOXIDE HYDROCHLORIDE 50 MG: 25 CAPSULE ORAL at 11:34

## 2019-08-05 RX ADMIN — GABAPENTIN 600 MG: 300 CAPSULE ORAL at 08:18

## 2019-08-05 RX ADMIN — Medication 1 TABLET: at 08:18

## 2019-08-05 RX ADMIN — GABAPENTIN 600 MG: 300 CAPSULE ORAL at 21:43

## 2019-08-05 RX ADMIN — HALOPERIDOL 5 MG: 5 TABLET ORAL at 19:43

## 2019-08-05 RX ADMIN — NICOTINE 1 PATCH: 21 PATCH, EXTENDED RELEASE TRANSDERMAL at 08:18

## 2019-08-05 RX ADMIN — LORAZEPAM 2 MG: 1 TABLET ORAL at 12:12

## 2019-08-05 RX ADMIN — LORAZEPAM 2 MG: 1 TABLET ORAL at 17:26

## 2019-08-05 RX ADMIN — LITHIUM CARBONATE 450 MG: 450 TABLET, EXTENDED RELEASE ORAL at 21:43

## 2019-08-05 RX ADMIN — HYDROXYZINE HYDROCHLORIDE 50 MG: 25 TABLET ORAL at 15:54

## 2019-08-05 RX ADMIN — TRAZODONE HYDROCHLORIDE 50 MG: 50 TABLET ORAL at 22:22

## 2019-08-05 RX ADMIN — CHLORDIAZEPOXIDE HYDROCHLORIDE 50 MG: 25 CAPSULE ORAL at 15:54

## 2019-08-05 RX ADMIN — LORAZEPAM 2 MG: 2 INJECTION INTRAMUSCULAR; INTRAVENOUS at 19:43

## 2019-08-05 RX ADMIN — LITHIUM CARBONATE 450 MG: 450 TABLET, EXTENDED RELEASE ORAL at 12:12

## 2019-08-05 RX ADMIN — LORAZEPAM 2 MG: 2 INJECTION INTRAMUSCULAR; INTRAVENOUS at 06:12

## 2019-08-05 RX ADMIN — LEVOTHYROXINE SODIUM 125 MCG: 100 TABLET ORAL at 13:35

## 2019-08-05 RX ADMIN — HYDROXYZINE HYDROCHLORIDE 50 MG: 25 TABLET ORAL at 03:32

## 2019-08-05 RX ADMIN — HYDROXYZINE HYDROCHLORIDE 50 MG: 25 TABLET ORAL at 22:22

## 2019-08-05 RX ADMIN — FOLIC ACID 1 MG: 1 TABLET ORAL at 08:18

## 2019-08-05 RX ADMIN — GABAPENTIN 600 MG: 300 CAPSULE ORAL at 15:54

## 2019-08-05 RX ADMIN — Medication 100 MG: at 21:43

## 2019-08-05 NOTE — ASSESSMENT & PLAN NOTE
· Likely secondary to alcohol abuse and per records patient does have a history of chronic hepatitis-C  · No signs of acute liver failure  · Follow up with GI as outpatient

## 2019-08-05 NOTE — H&P
Psychiatric Evaluation - [address] 44 y o  male MRN: [id number]  Unit/Bed#: [facility] 254-01 Encounter: [id number]    Assessment/Plan   Principal Problem:    B[facility] disorder ([facility] )  Active Problems:    Alcohol withdrawal syndrome, with delirium ([facility] )    Current smoker    Bradycardia    Other specified hypothyroidism    Elevated LFTs    Plan:   Risks, benefits and possible side effects of Medications:   Risks, benefits, and possible side effects of medications explained to patient and patient verbalizes understanding  1  Admit to acute psychiatric level of care for safety and stability  2  CIWA protocol, Librium taper started, and Ativan p r n  For alcohol withdrawal symptoms  3  Safety precautions, and Q 7 minutes checks  4 individual, group and milieu therapy  5  Restart lithium  6  Consult Internal Medicine due to bradycardia  7  Discharge in aftercare planning is pending    Chief Complaint:  I was drunk and the police picked me up    History of Present Illness     Patient is a 44 y o  male presents on a 201 after he was brought to the emergency room by the police intoxicated  Patient who is unreliable historian reports he was walking down the street and the police picked him up  In the ER he has a blood alcohol level of 120, and was positive for cocaine and benzodiazepines  Patient seems very agitated and fixated on leaving today  Patient does report history of withdrawal seizures and DTs  He did report that he had to be in the ICU for withdrawal symptoms before  Patient is uncooperative, yelling, screaming and cursing  He did sign a 72 hour to withdraw from treatment      Psychiatric Review Of Systems:  sleep: no  appetite changes: yes  weight changes: yes  energy/anergy: yes  interest/pleasure/anhedonia: yes  somatic symptoms: yes  anxiety/panic: yes  paula: yes  guilty/hopeless: no  self injurious behavior/risky behavior: no    Historical Information     Past Psychiatric History: Dual drug/alcohol      Substance Abuse History:  Social History     Tobacco History     Smoking Status  Current Every Day Smoker Smoking Frequency  1 pack/day Smoking Tobacco Type  Cigarettes    Smokeless Tobacco Use  Never Used          Alcohol History     Alcohol Use Status  Yes Comment  case daily           Drug Use     Drug Use Status  Never Comment  "I just drank"  Sexual Activity     Sexually Active  Yes Partners  Female          Activities of Daily Living    Not Asked               Additional Substance Use Detail     Questions Responses    Cocaine frequency     Comment: positive on admission     Cocaine method Snort    Comment: Snort on 8/4/2019     Cocaine last use 8/3/19    Comment: 8/3/2019 on 8/4/2019     Last reviewed by Hannah Camacho RN on 8/4/2019        I have assessed this patient for substance use within the past 12 months      Past Medical History:   Diagnosis Date    Bipolar 1 disorder [facility])        Medical Review Of Systems:  Pertinent items are noted in HPI      Meds/Allergies   all current active meds have been reviewed  Allergies   Allergen Reactions    Shellfish-Derived Products      Seafood       Objective   Vital signs in last 24 hours:  Temp:  [97 5 °F (36 4 °C)-97 8 °F (36 6 °C)] 97 5 °F (36 4 °C)  HR:  [45-52] 50  Resp:  [16] 16  BP: (117-153)/(72-90) 132/72    No intake or output data in the 24 hours ending 08/05/19 1311    Mental Status Evaluation:  Appearance:  disheveled   Behavior:  psychomotor agitation   Speech:  loud   Mood:  angry and irritable   Affect:  labile   Language: repeating phrases   Thought Process:  circumstantial   Thought Content:  impoverished   Perceptual Disturbances: None   Risk Potential: Potential for Aggression No   Sensorium:  person and place   Cognition:  grossly intact   Consciousness:  awake    Attention: attention span and concentration were age appropriate   Intellect: not examined   Fund of Knowledge: vocabulary: limited   Insight: limited   Judgment: limited   Muscle Strength and Tone: face and neck   Gait/Station: slow   Motor Activity: no abnormal movements     Lab Results: I have personally reviewed pertinent lab results  I  Patient Strengths/Assets: compliant with medication    Patient Barriers/Limitations: substance abuse    Counseling / Coordination of Care  Total floor / unit time spent today 60 minutes  Greater than 50% of total time was spent with the patient and / or family counseling and / or coordination of care   A description of the counseling / coordination of care:

## 2019-08-05 NOTE — PROGRESS NOTES
Daily Rounds Documentation    Team Members Present:   MD Denver Van CRNP Donovan Penning, SOLITARIO Garland Steviecally, [address]    06 signed 8/4/19 1653  Positive for Cocaine and Benzo's  Alcoholic; drinks a case a day  Visible hand tremors  IM Ativan for irritability and anxiety  EKG needed for hx of DT

## 2019-08-05 NOTE — ASSESSMENT & PLAN NOTE
· Asymptomatic  · Will follow up EKG  · Possibly secondary to medications  · Avoid beta-blockers  · Follow up as outpatient with PCP

## 2019-08-05 NOTE — PROGRESS NOTES
Patient was asked by this [facility] staff to have vital signs assessed  Patient refused to have vital sign taken  Nursing staff made aware that patient would not allow to have vital signs taken

## 2019-08-05 NOTE — ASSESSMENT & PLAN NOTE
· TSH within normal limits  · Patient with history of noncompliance with medications  · Continue levothyroxine  · Follow up with PCP as outpatient for further management

## 2019-08-05 NOTE — PLAN OF CARE
Problem: Ineffective Coping  Goal: Cooperates with admission process  Description  Interventions:   - Complete admission process  Outcome: Progressing  Goal: Free from restraint events  Description  - Utilize least restrictive measures   - Provide behavioral interventions   - Redirect inappropriate behaviors   Outcome: Progressing     Problem: Risk for Self Injury/Neglect  Goal: Refrain from harming self  Description  Interventions:  - Monitor patient closely, per order  - Develop a trusting relationship  - Supervise medication ingestion, monitor effects and side effects   Outcome: Progressing     Problem: Ineffective Coping  Goal: Identifies ineffective coping skills  Outcome: Not Progressing  Goal: Identifies healthy coping skills  Outcome: Not Progressing  Goal: Demonstrates healthy coping skills  Outcome: Not Progressing  Goal: Participates in unit activities  Description  Interventions:  - Provide therapeutic environment   - Provide required programming   - Redirect inappropriate behaviors   Outcome: Not Progressing  Goal: Patient/Family participate in treatment and DC plans  Description  Interventions:  - Provide therapeutic environment  Outcome: Not Progressing  Goal: Patient/Family verbalizes awareness of resources  Outcome: Not Progressing  Goal: Understands least restrictive measures  Description  Interventions:  - Utilize least restrictive behavior  Outcome: Not Progressing     Problem: Risk for Self Injury/Neglect  Goal: Treatment Goal: Remain safe during length of stay, learn and adopt new coping skills, and be free of self-injurious ideation, impulses and acts at the time of discharge  Outcome: Not Progressing  Goal: Verbalize thoughts and feelings  Description  Interventions:  - Assess and re-assess patient's lethality and potential for self-injury  - Engage patient in 1:1 interactions, daily, for a minimum of 15 minutes  - Encourage patient to express feelings, fears, frustrations, hopes  - Establish rapport/trust with patient   Outcome: Not Progressing  Goal: Attend and participate in unit activities, including therapeutic, recreational, and educational groups  Description  Interventions:  - Provide therapeutic and educational activities daily, encourage attendance and participation, and document same in the medical record  - Obtain collateral information, encourage visitation and family involvement in care   Outcome: Not Progressing  Goal: Recognize maladaptive responses and adopt new coping mechanisms  Outcome: Not Progressing  Goal: Complete daily ADLs, including personal hygiene independently, as able  Description  Interventions:  - Observe, teach, and assist patient with ADLS  - Monitor and promote a balance of rest/activity, with adequate nutrition and elimination  Outcome: Not Progressing     Problem: Anxiety  Goal: Anxiety is at manageable level  Description  Interventions:  - Assess and monitor patient's anxiety level  - Monitor for signs and symptoms of anxiety both physical and emotional (heart palpitations, chest pain, shortness of breath, headaches, nausea, feeling jumpy, restlessness, irritable, apprehensive)  - Collaborate with interdisciplinary team and initiate plan and interventions as ordered    - [place] patient to unit/surroundings  - Explain treatment plan  - Encourage participation in care  - Encourage verbalization of concerns/fears  - Identify coping mechanisms  - Assist in developing anxiety-reducing skills  - Administer/offer alternative therapies  - Limit or eliminate stimulants  Outcome: Not Progressing     Problem: Individualized Interventions  Goal: Patient will verbalize appropriate use of telephone within 5 days  Description  Interventions:  - Treatment team to determine use of supervised phone privileges   Outcome: Not Progressing  Goal: Patient will verbalize need for hospitalization and will no longer attempt elopement within 5 days  Description  Interventions:  - Ongoing education to help patient understand need for hospitalization  Outcome: Not Progressing  Goal: Patient will recognize inappropriate behaviors and develop alternative behaviors within 5 days  Description  Interventions:  - Patient in collaboration with Treatment Team will develop a behavior management plan to help identify effective coping skills to deal with stressors  Outcome: Not Progressing     Problem: Ineffective Coping  Goal: Participates in unit activities  Description  Interventions:  - Provide therapeutic environment   - Provide required programming   - Redirect inappropriate behaviors   Outcome: Not Progressing

## 2019-08-05 NOTE — PROGRESS NOTES
Assumed care of this patient from prior shift RN on 8/4/19 at approximately 2300  Patient remains in bed sleeping at this time  He appears comfortable, lying on his left side; no obvious signs of distress observed  His breathing is easy and non-labored on room air  Respirations 16  Will CTM via q7 minute safety checks

## 2019-08-05 NOTE — PROGRESS NOTES
Patient awoke and ambulated to the nurses station informing he feels anxious  He rates his anxiety 8/10  Amaya Scale performed with a result of 18  Administered PRN Atarax as per order for moderate anxiety  Will monitor for medication effectiveness       Edited to include VS:  45-/86

## 2019-08-05 NOTE — PROGRESS NOTES
Pt has been withdrawn to quiet room per his request  Pt did come out for meds and breakfast  Pt is irritable and demanding his street clothing so he can leave  Explained to pt that he is not scheduled to DC at this time and must speak with the doctor  moderate hand tremors noted with arms extended  Reports mild nausea, no vomiting  Pt states he is having "a little bit " of A/V hallucinations, but does not elaborate on what they are  CIWA assessed at 15  Denies current SI, HI  Monitoring continues

## 2019-08-05 NOTE — CONSULTS
Consult- Tessa Taylor 1970, 44 y o  male MRN: [id number]    Unit/Bed#: [facility] 254-01 Encounter: [id number]    Primary Care Provider: No primary care provider on file  Date and time admitted to hospital: 8/4/2019  4:43 PM      Inpatient consult to Internal Medicine  Consult performed by: Brent James MD  Consult ordered by: ROSALINO Pratt        Alcohol withdrawal syndrome, with delirium ([facility] [address] )  Assessment & Plan  · Continue Librium taper  · Ativan as needed  · Folate, thiamine, MVI  · Patient currently appears to be stable  · Counseled on alcohol cessation  · Case management evaluation for potential rehab options after cleared from psychiatry team    Bradycardia  Assessment & Plan  · Asymptomatic  · Will follow up EKG  · Possibly secondary to medications  · Avoid beta-blockers  · Follow up as outpatient with PCP    Other specified hypothyroidism  Assessment & Plan  · TSH within normal limits  · Patient with history of noncompliance with medications  · Continue levothyroxine  · Follow up with PCP as outpatient for further management    Elevated LFTs  Assessment & Plan  · Likely secondary to alcohol abuse and per records patient does have a history of chronic hepatitis-C  · No signs of acute liver failure  · Follow up with GI as outpatient    Current smoker  Assessment & Plan  · Continue nicotine patch  · Counseled smoking cessation    * Bipolar disorder [facility])  Assessment & Plan  · Management per psychiatry team    Recommendations for Discharge:  · Per Primary Service      History of Present Illness:  Tessa Taylor is a 44 y o  male who is originally admitted to the psychiatry service due to alcoholism and bipolar disorder  We are consulted for medical management/evaluation  Patient currently in [address] unit due to suicidal ideation and alcohol intoxication  Patient currently on Librium taper as well as Ativan as needed    Patient has been having intermittent agitation and hallucinations while in the [facility] unit  Patient was noted to be bradycardic and EKG has been ordered  Patient denies any chest pain or shortness of breath  Patient does have a history of hypothyroidism however he states that he has not been taking his medications regularly  Patient denies any constipation  Patient complaining of nausea however he was able to tolerate lunch this afternoon  No vomiting  No fever or chills  Review of Systems:  Review of Systems   Constitutional: Negative for chills and fever  Respiratory: Negative for cough, chest tightness and shortness of breath  Cardiovascular: Negative for chest pain and leg swelling  Gastrointestinal: Positive for nausea  Negative for abdominal distention, abdominal pain, blood in stool and diarrhea  Genitourinary: Negative for frequency  Musculoskeletal: Negative for back pain  Psychiatric/Behavioral: Positive for agitation, behavioral problems and hallucinations  All other systems reviewed and are negative  Past Medical and Surgical History:   Past Medical History:   Diagnosis Date    Bipolar 1 disorder ([facility] [address] )        No past surgical history on file  Meds/Allergies:  all medications and allergies reviewed    Allergies: Allergies   Allergen Reactions    Shellfish-Derived Products      Seafood       Social History:     Marital Status: Single    Substance Use History:   Social History     Substance and Sexual Activity   Alcohol Use Yes    Frequency: 4 or more times a week    Drinks per session: 10 or more    Binge frequency: Daily or almost daily    Comment: case daily      Social History     Tobacco Use   Smoking Status Current Every Day Smoker    Packs/day: 1 00    Types: Cigarettes   Smokeless Tobacco Never Used     Social History     Substance and Sexual Activity   Drug Use Never    Comment: "I just drank"          Family History:  Patient denies any history of malignancies in his immediate family    Physical Exam:   Vitals: Blood Pressure: 132/72 (08/05/19 0900)  Pulse: (!) 50 (08/05/19 0900)  Temperature: 97 5 °F (36 4 °C) (08/05/19 0750)  Temp Source: Temporal (08/05/19 0750)  Respirations: 16 (08/05/19 0750)  Height: 5' 8" (172 7 cm) (08/04/19 1648)  Weight - Scale: 67 1 kg (148 lb) (08/04/19 1648)  SpO2: 97 % (08/05/19 0750)    Physical Exam   Constitutional: He appears well-developed  No distress  HENT:   Head: Normocephalic and atraumatic  Eyes: Conjunctivae and EOM are normal    Neck: Normal range of motion  Neck supple  Cardiovascular: Regular rhythm  Bradycardia present  Pulmonary/Chest: Effort normal  No respiratory distress  Abdominal: Soft  He exhibits no distension  There is no tenderness  Musculoskeletal: Normal range of motion  He exhibits no edema  Neurological: He is alert  No cranial nerve deficit  Coordination normal    Skin: Skin is warm and dry  Psychiatric:   Intermittent agitation  Currently with normal behavior       Additional Data:   Lab Results: I have personally reviewed pertinent reports  Results from last 7 days   Lab Units 08/05/19  0552   WBC Thousand/uL 6 60   HEMOGLOBIN g/dL 15 5   HEMATOCRIT % 44 7   PLATELETS Thousands/uL 127*   NEUTROS PCT % 44   LYMPHS PCT % 43   MONOS PCT % 9   EOS PCT % 4     Results from last 7 days   Lab Units 08/05/19  0552   POTASSIUM mmol/L 3 8   CHLORIDE mmol/L 109*   CO2 mmol/L 26   BUN mg/dL 16   CREATININE mg/dL 0 79   CALCIUM mg/dL 9 1   ALK PHOS U/L 33*   ALT U/L 70*   AST U/L 57*             No results found for: HGBA1C        Imaging: I have personally reviewed pertinent reports  No orders to display       EKG, Pathology, and Other Studies Reviewed on Admission:   · EKG: Will follow up    ** Please Note: This note has been constructed using a voice recognition system   **

## 2019-08-05 NOTE — ASSESSMENT & PLAN NOTE
· Continue Librium taper  · Ativan as needed  · Folate, thiamine, MVI  · Patient currently appears to be stable  · Counseled on alcohol cessation  · Case management evaluation for potential rehab options after cleared from psychiatry team

## 2019-08-05 NOTE — PROGRESS NOTES
This writer approached patient while he was standing at the nurses station verbally abusing staff and threatening an outburst, refusing to redirect from abusive and intrusive behaviors  Pt behavior/presentation is similar to that of last Friday  Pt is demanding discharge or stating he will "continue to act like an asshole"  Pt was encouraged to discuss frustrations/situation that are leading to his current behavior  Pt refuses to process with staff when offered  Instead, pt tests limits with staff and demands food/drink,  asks to be restraints or "knocked out"  Pt ultimately ended discussion by walking away to room and stating "fuck you" under his breath  Patient has been resting quietly in bed since

## 2019-08-05 NOTE — PROGRESS NOTES
This writer approached pt in [facility] near nurses [facility], while he was yelling and cursing and knocking his fist on the window  Pt initially declined to talk but then requested to talk in the quiet room  When provided the opportunity to express frustrations, pt shared he goes through alcohol withdrawal frequently at [facility] where they always "put him down" in the ICU and because of this, he feels his withdrawal isn't being properly managing now  This writer reassured patient that the team is capable of managing his withdrawal despite the difference in approach  Scheduled librium taper and availability of PRN ativan reviewed with patient  Pt was attentive but remained agitated  Pt was redirected to discuss goals  Pt initially said, "to get the fuck out of this place" but with further discussion informed he would like IP rehab  This writer discussed how a 72hr notice may affect treatment  Pt ignored such and instead requested more ativan  Medication regimen was again reviewed  No further outbursts/concerns noted at this time

## 2019-08-05 NOTE — PROGRESS NOTES
Pt awake and out of bed after 1115, demanded to see MD  Agreeable to taking librium, but angry that he is here  Yelling loudly, making threats if he doesn't get to leave  Believes this is senior care and that he is here for being drunk

## 2019-08-05 NOTE — PROGRESS NOTES
PRN anxiolytic appears to be effective as patient is currently in bed sleeping  He appears to be comfortable (lying on his right side) and in no acute distress  Respirations 14  Breathing continues to be easy and non-labored on room air  CIWA scoring performed this am 0247 with a result of 3  Will CTM via q7 minute safety checks
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
## 2019-08-05 NOTE — PROGRESS NOTES
Pt was given PRN Ativan 2 mg PO @ 1212  Pt was able to remain controlled and his agitation level started to decrease  Pt agrees to have EKG done and would like help finding a rehab

## 2019-08-05 NOTE — TREATMENT PLAN
Treatment Plan Finn [age] y o  male MRN: [id number]    [facility] [id number] Encounter: [id number]          Admit Date/Time:  8/4/2019  4:43 PM    Treatment Team: Attending Provider: Daria Tyson MD; Patient Care Assistant: Blanka Landeros; Patient Care Assistant: Jf Huynh; Patient Care Assistant: Sylvester Mcgee; Patient Care Technician: Suzette Lopez; Patient Care Assistant: Shellie Burciaga; Patient Care Technician: Elder Stanton; Recreational Therapist: Fantasma Castro; :  [facility]; Registered Nurse: Angie Adam RN; Consulting Physician: Akil Shelby MD    Diagnosis: Principal Problem:    Bipolar disorder [facility])  Active Problems:    Alcohol withdrawal syndrome, with delirium ([facility] [address] )    Current smoker    Bradycardia    Other specified hypothyroidism    Elevated LFTs      Mental Status Evaluation:  Appearance:  casually dressed   Behavior:  uncooperative   Speech:  loud   Mood:  angry   Affect:  increased in intensity   Language: repeating phrases   Thought Process:  illogical   Thought Content:  obsessions   Perceptual Disturbances: None   Risk Potential: Potential for Aggression No   Sensorium:  person   Cognition:  grossly intact   Consciousness:  awake    Attention: attention span appeared shorter than expected for age   Intellect: not examined   Fund of Knowledge: vocabulary: limited   Insight:  limited   Judgment: limited   Muscle Location: face and neck   Gait/Station: normal balance   Motor Activity: no abnormal movements     Patient Strengths: compliant with medication     Patient Barriers: substance abuse    Progress Toward Goals: ongoing    Recommended Treatment: discharge planning     Treatment Frequency: 2-3 times per week     Expected Discharge Date: 8/4/2019    Discharge Plan: referrals as indicated     Treatment Plan Created/Updated By: William Harding MD

## 2019-08-05 NOTE — PROGRESS NOTES
[phone]  Patient's mood is labile  Quickly became irritable and reported withdrawal symptoms and his CIWA was found to be 24  When offered PRN Ativan, patient began arguing with this RN and stated "1mg of Ativan isn't going to keep me from having seizures," and refused  IM Ativan given instead for anxiety and withdrawal symptoms  Patient was then more controlled and able to speak to staff appropriately  Reported nausea and Zofran SL tablets ordered and administered to patient  He reported relief  No complaints of pain  Will continue monitoring

## 2019-08-06 PROCEDURE — 99231 SBSQ HOSP IP/OBS SF/LOW 25: CPT | Performed by: NURSE PRACTITIONER

## 2019-08-06 RX ORDER — GABAPENTIN 400 MG/1
800 CAPSULE ORAL 3 TIMES DAILY
Status: DISCONTINUED | OUTPATIENT
Start: 2019-08-06 | End: 2019-08-07 | Stop reason: HOSPADM

## 2019-08-06 RX ORDER — CHLORDIAZEPOXIDE HYDROCHLORIDE 25 MG/1
25 CAPSULE, GELATIN COATED ORAL 3 TIMES DAILY
Status: DISCONTINUED | OUTPATIENT
Start: 2019-08-06 | End: 2019-08-06

## 2019-08-06 RX ORDER — CHLORDIAZEPOXIDE HYDROCHLORIDE 5 MG/1
10 CAPSULE, GELATIN COATED ORAL 3 TIMES DAILY
Status: DISCONTINUED | OUTPATIENT
Start: 2019-08-06 | End: 2019-08-07 | Stop reason: HOSPADM

## 2019-08-06 RX ADMIN — LITHIUM CARBONATE 450 MG: 450 TABLET, EXTENDED RELEASE ORAL at 21:08

## 2019-08-06 RX ADMIN — GABAPENTIN 800 MG: 400 CAPSULE ORAL at 21:08

## 2019-08-06 RX ADMIN — FOLIC ACID 1 MG: 1 TABLET ORAL at 10:30

## 2019-08-06 RX ADMIN — GABAPENTIN 800 MG: 400 CAPSULE ORAL at 16:42

## 2019-08-06 RX ADMIN — LITHIUM CARBONATE 450 MG: 450 TABLET, EXTENDED RELEASE ORAL at 10:30

## 2019-08-06 RX ADMIN — CHLORDIAZEPOXIDE HYDROCHLORIDE 50 MG: 25 CAPSULE ORAL at 10:29

## 2019-08-06 RX ADMIN — NICOTINE 1 PATCH: 21 PATCH, EXTENDED RELEASE TRANSDERMAL at 09:55

## 2019-08-06 RX ADMIN — CHLORDIAZEPOXIDE HYDROCHLORIDE 10 MG: 5 CAPSULE ORAL at 21:08

## 2019-08-06 RX ADMIN — Medication 1 TABLET: at 10:29

## 2019-08-06 RX ADMIN — CHLORDIAZEPOXIDE HYDROCHLORIDE 10 MG: 5 CAPSULE ORAL at 16:42

## 2019-08-06 RX ADMIN — GABAPENTIN 600 MG: 300 CAPSULE ORAL at 10:30

## 2019-08-06 RX ADMIN — LORAZEPAM 1 MG: 1 TABLET ORAL at 19:06

## 2019-08-06 RX ADMIN — Medication 100 MG: at 21:08

## 2019-08-06 NOTE — PLAN OF CARE
PT continues to decline all invites to attend offered art therapy groups and is most often observed isolating in pt room  PT displays a depressed mood and affect, with fleeting eye contact  PT does answer appropriately when prompted, however does appear unmotivated in his treatment and recovery  PT does have some positive social interactions with select peers  PT will attend at least 50% of art therapy groups to provide for relaxation and to allow for creative expression of feelings and emotions       Problem: Ineffective Coping  Goal: Participates in unit activities  Description  Interventions:  - Provide therapeutic environment   - Provide required programming   - Redirect inappropriate behaviors   Outcome: Not Progressing

## 2019-08-06 NOTE — PROGRESS NOTES
Progress Note - Behavioral Health     Armando Gosselin 44 y o  male MRN: [id number]   Unit/Bed#: [facility] 254-01 Encounter: [id number]    Behavior over the last 24 hours:    Leeanne Pacheco was seen for an inpatient follow-up psychiatric visit this date  At today's visit, he appeared sedated and was stumbling and having difficulty walking  He undressed in the office during assessment to change clothes  He is taking his medications as prescribed  He remains discharge focused and agitated regarding being in the hospital   His behaviors remain controlled at this time  He signed a 72 hour notice  ROS: no complaints    Mental Status Evaluation:    Appearance:  disheveled, marginal hygiene   Behavior:  demanding, inappropriate   Speech:  normal rate and volume   Mood:  labile   Affect:  brighter   Thought Process:  tangential   Associations: concrete associations   Thought Content:  no overt delusions   Perceptual Disturbances: none   Risk Potential: Suicidal ideation - None  Homicidal ideation - None  Potential for aggression - No   Sensorium:  oriented to person, place and time/date   Memory:  recent and remote memory grossly intact   Consciousness:  alert and awake   Attention: poor concentration and poor attention span   Insight:  poor   Judgment: poor   Gait/Station: normal gait/station, normal balance   Motor Activity: no abnormal movements     Vital signs in last 24 hours:    Temp:  [97 8 °F (36 6 °C)] 97 8 °F (36 6 °C)  HR:  [47] 47  Resp:  [16] 16  BP: (97)/(57) 97/57    Laboratory results:  I have personally reviewed all pertinent laboratory/tests results  Progress Toward Goals: progressing    Assessment/Plan   Principal Problem:    Bipolar disorder ([facility] )  Active Problems:    Alcohol withdrawal syndrome, with delirium ([facility] )    Current smoker    Bradycardia    Other specified hypothyroidism    Elevated LFTs    Recommended Treatment:   Continue current medications  Continue to monitor    Discharge disposition and planning are ongoing  All current active medications have been reviewed  Encourage group therapy, milieu therapy and occupational therapy  Behavioral Health checks every 7 minutes      Current Facility-Administered Medications:  acetaminophen 650 mg Oral Q6H PRN Yadi Montejo, CRNP   acetaminophen 650 mg Oral Q4H PRN Yadi Montejo, CRNP   acetaminophen 975 mg Oral Q6H PRN Yadi Montejo, CRNP   aluminum-magnesium hydroxide-simethicone 30 mL Oral Q4H PRN Yadi Montejo, CRNP   chlordiazePOXIDE 25 mg Oral TID Hayde Silva MD   folic acid 1 mg Oral Daily Yadi Montejo, CRNP   gabapentin 800 mg Oral TID Yadi Montejo, CRNP   haloperidol 5 mg Oral Q6H PRN Yadi Montejo, CRNP   haloperidol lactate 5 mg Intramuscular Q6H PRN Yadi Montejo, CRNP   hydrOXYzine HCL 25 mg Oral Q6H PRN Yadi Montejo, CRNP   hydrOXYzine HCL 50 mg Oral Q4H PRN Yadi Montejo, CRNP   levothyroxine 125 mcg Oral Early Morning Ezequiel Theodore MD   lithium carbonate 450 mg Oral Q12H [facility] Hayde Silva MD   LORazepam 1 mg Oral Q4H PRN Yadi Montejo, CRNP   magnesium hydroxide 30 mL Oral Daily PRN Yadi Montejo, CRNP   multivitamin-minerals 1 tablet Oral Daily Yadi Montejo, CRNP   nicotine 1 patch Transdermal Daily Yadi Montejo, CRNP   OLANZapine 10 mg Intramuscular Q3H PRN Yadi Montejo, CRNP   ondansetron 4 mg Oral Q6H PRN Brenna Fountain, CRNP   risperiDONE 1 mg Oral Q3H PRN Yadi Montejo, CRNP   thiamine 100 mg Oral HS Yadi Montejo, CRNP   traZODone 50 mg Oral HS PRN Yadi Montejo, CRNP       Risks / Benefits of Treatment:    Risks, benefits, and possible side effects of medications explained to patient and patient verbalizes understanding and agreement for treatment  Counseling / Coordination of Care:      Patient's progress discussed with staff in treatment team meeting  Medications, treatment progress and treatment plan reviewed with patient

## 2019-08-06 NOTE — PLAN OF CARE
Problem: Ineffective Coping  Goal: Patient/Family participate in treatment and DC plans  Description  Interventions:  - Provide therapeutic environment  Outcome: Progressing  Goal: Patient/Family verbalizes awareness of resources  Outcome: Progressing  Goal: Understands least restrictive measures  Description  Interventions:  - Utilize least restrictive behavior  Outcome: Progressing     Problem: Anxiety  Goal: Anxiety is at manageable level  Description  Interventions:  - Assess and monitor patient's anxiety level  - Monitor for signs and symptoms of anxiety both physical and emotional (heart palpitations, chest pain, shortness of breath, headaches, nausea, feeling jumpy, restlessness, irritable, apprehensive)  - Collaborate with interdisciplinary team and initiate plan and interventions as ordered    - Springer patient to unit/surroundings  - Explain treatment plan  - Encourage participation in care  - Encourage verbalization of concerns/fears  - Identify coping mechanisms  - Assist in developing anxiety-reducing skills  - Administer/offer alternative therapies  - Limit or eliminate stimulants  Outcome: Progressing     Problem: DISCHARGE PLANNING - CARE MANAGEMENT  Goal: Discharge to post-acute care or home with appropriate resources  Description  INTERVENTIONS:  - Conduct assessment to determine patient/family and health care team treatment goals, and need for post-acute services based on payer coverage, community resources, and patient preferences, and barriers to discharge  - Address psychosocial, clinical, and financial barriers to discharge as identified in assessment in conjunction with the patient/family and health care team  - Arrange appropriate level of post-acute services according to patient's   needs and preference and payer coverage in collaboration with the physician and health care team  - Communicate with and update the patient/family, physician, and health care team regarding progress on the discharge plan  - Arrange appropriate transportation to post-acute venues   Outcome: Progressing     Problem: SUBSTANCE USE/ABUSE  Goal: By discharge, will develop insight into their chemical dependency and sustain motivation to continue in recovery  Description  INTERVENTIONS:  - Attends all daily group sessions and scheduled AA groups  - Actively practices coping skills through participation in the therapeutic community and adherence to program rules  - Reviews and completes assignments from individual treatment plan  - Assist patient development of understanding of their personal cycle of addiction and relapse triggers  Outcome: Progressing  Goal: By discharge, patient will have ongoing treatment plan addressing chemical dependency  Description  INTERVENTIONS:  - Assist patient with resources and/or appointments for ongoing recovery based living  Outcome: Progressing     Problem: Ineffective Coping  Goal: Cooperates with admission process  Description  Interventions:   - Complete admission process  Outcome: Not Progressing  Goal: Identifies ineffective coping skills  Outcome: Not Progressing  Goal: Identifies healthy coping skills  Outcome: Not Progressing  Goal: Demonstrates healthy coping skills  Outcome: Not Progressing  Goal: Participates in unit activities  Description  Interventions:  - Provide therapeutic environment   - Provide required programming   - Redirect inappropriate behaviors   Outcome: Not Progressing  Goal: Free from restraint events  Description  - Utilize least restrictive measures   - Provide behavioral interventions   - Redirect inappropriate behaviors   Outcome: Not Progressing     Problem: Risk for Self Injury/Neglect  Goal: Treatment Goal: Remain safe during length of stay, learn and adopt new coping skills, and be free of self-injurious ideation, impulses and acts at the time of discharge  Outcome: Not Progressing  Goal: Verbalize thoughts and feelings  Description  Interventions:  - Assess and re-assess patient's lethality and potential for self-injury  - Engage patient in 1:1 interactions, daily, for a minimum of 15 minutes  - Encourage patient to express feelings, fears, frustrations, hopes  - Establish rapport/trust with patient   Outcome: Not Progressing  Goal: Refrain from harming self  Description  Interventions:  - Monitor patient closely, per order  - Develop a trusting relationship  - Supervise medication ingestion, monitor effects and side effects   Outcome: Not Progressing  Goal: Attend and participate in unit activities, including therapeutic, recreational, and educational groups  Description  Interventions:  - Provide therapeutic and educational activities daily, encourage attendance and participation, and document same in the medical record  - Obtain collateral information, encourage visitation and family involvement in care   Outcome: Not Progressing  Goal: Recognize maladaptive responses and adopt new coping mechanisms  Outcome: Not Progressing  Goal: Complete daily ADLs, including personal hygiene independently, as able  Description  Interventions:  - Observe, teach, and assist patient with ADLS  - Monitor and promote a balance of rest/activity, with adequate nutrition and elimination  Outcome: Not Progressing     Problem: Individualized Interventions  Goal: Patient will verbalize appropriate use of telephone within 5 days  Description  Interventions:  - Treatment team to determine use of supervised phone privileges   Outcome: Not Progressing  Goal: Patient will verbalize need for hospitalization and will no longer attempt elopement within 5 days  Description  Interventions:  - Ongoing education to help patient understand need for hospitalization  Outcome: Not Progressing  Goal: Patient will recognize inappropriate behaviors and develop alternative behaviors within 5 days  Description  Interventions:  - Patient in collaboration with Treatment Team will develop a behavior management plan to help identify effective coping skills to deal with stressors  Outcome: Not Progressing     Problem: Ineffective Coping  Goal: Participates in unit activities  Description  Interventions:  - Provide therapeutic environment   - Provide required programming   - Redirect inappropriate behaviors   Outcome: Not Progressing

## 2019-08-06 NOTE — PROGRESS NOTES
Daily Rounds Documentation    Team Members Present:   MD Marisela Watters, RN  Ana Ray, ISSAW    Bradycardic  Haldol and Ativan PRN given for agitation  Sedated this AM from medications  Possible discharge tomorrow
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
## 2019-08-06 NOTE — NURSING NOTE
[phone]  Pt found to be sleeping on most of authors rounds  No acute behavioral issues noted  Q 7 min checks maintained  Fall protocol in place

## 2019-08-07 ENCOUNTER — APPOINTMENT (EMERGENCY)
Dept: CT IMAGING | Facility: HOSPITAL | Age: 49
End: 2019-08-07
Payer: MEDICARE

## 2019-08-07 ENCOUNTER — HOSPITAL ENCOUNTER (EMERGENCY)
Facility: HOSPITAL | Age: 49
Discharge: HOME | End: 2019-08-08
Attending: EMERGENCY MEDICINE
Payer: MEDICARE

## 2019-08-07 VITALS
BODY MASS INDEX: 22.43 KG/M2 | OXYGEN SATURATION: 93 % | HEIGHT: 68 IN | SYSTOLIC BLOOD PRESSURE: 98 MMHG | DIASTOLIC BLOOD PRESSURE: 54 MMHG | TEMPERATURE: 97.6 F | RESPIRATION RATE: 18 BRPM | HEART RATE: 58 BPM | WEIGHT: 148 LBS

## 2019-08-07 DIAGNOSIS — F10.929 ALCOHOL INTOXICATION (HCC): Primary | ICD-10-CM

## 2019-08-07 LAB
ALBUMIN SERPL BCP-MCNC: 5.1 G/DL (ref 3–5.2)
ALP SERPL-CCNC: 52 U/L (ref 43–122)
ALT SERPL W P-5'-P-CCNC: 102 U/L (ref 9–52)
ANION GAP SERPL CALCULATED.3IONS-SCNC: 11 MMOL/L (ref 5–14)
APAP SERPL-MCNC: <10 UG/ML (ref 10–20)
AST SERPL W P-5'-P-CCNC: 112 U/L (ref 17–59)
BASE EX.OXY STD BLDV CALC-SCNC: 46.1 %
BASE EXCESS BLDV CALC-SCNC: 2.9 MMOL/L (ref -2.1–2.1)
BASOPHILS # BLD AUTO: 0.1 THOUSANDS/ΜL (ref 0–0.1)
BASOPHILS NFR BLD AUTO: 1 % (ref 0–1)
BILIRUB SERPL-MCNC: 1.1 MG/DL
BUN SERPL-MCNC: 19 MG/DL (ref 5–25)
CALCIUM SERPL-MCNC: 10.4 MG/DL (ref 8.4–10.2)
CHLORIDE SERPL-SCNC: 102 MMOL/L (ref 97–108)
CK MB SERPL-MCNC: 1.7 NG/ML (ref 0–2.4)
CK MB SERPL-MCNC: <1 % (ref 0–2.5)
CK SERPL-CCNC: 639 U/L (ref 55–170)
CO2 SERPL-SCNC: 30 MMOL/L (ref 22–30)
CREAT SERPL-MCNC: 0.86 MG/DL (ref 0.7–1.5)
EOSINOPHIL # BLD AUTO: 0.1 THOUSAND/ΜL (ref 0–0.4)
EOSINOPHIL NFR BLD AUTO: 1 % (ref 0–6)
ERYTHROCYTE [DISTWIDTH] IN BLOOD BY AUTOMATED COUNT: 13.3 %
ETHANOL SERPL-MCNC: 235 MG/DL (ref 0–10)
GFR SERPL CREATININE-BSD FRML MDRD: 100 ML/MIN/1.73SQ M
GLUCOSE SERPL-MCNC: 81 MG/DL (ref 70–99)
GLUCOSE SERPL-MCNC: 82 MG/DL (ref 65–140)
HCO3 BLDV-SCNC: 31.7 MMOL/L (ref 23–28)
HCT VFR BLD AUTO: 49.7 % (ref 41–53)
HGB BLD-MCNC: 17 G/DL (ref 13.5–17.5)
LYMPHOCYTES # BLD AUTO: 3 THOUSANDS/ΜL (ref 0.5–4)
LYMPHOCYTES NFR BLD AUTO: 38 % (ref 25–45)
MCH RBC QN AUTO: 33.4 PG (ref 26–34)
MCHC RBC AUTO-ENTMCNC: 34.3 G/DL (ref 31–36)
MCV RBC AUTO: 98 FL (ref 80–100)
MONOCYTES # BLD AUTO: 0.5 THOUSAND/ΜL (ref 0.2–0.9)
MONOCYTES NFR BLD AUTO: 6 % (ref 1–10)
NEUTROPHILS # BLD AUTO: 4.2 THOUSANDS/ΜL (ref 1.8–7.8)
NEUTS SEG NFR BLD AUTO: 53 % (ref 45–65)
O2 CT BLDV-SCNC: 11.5 ML/DL
PCO2 BLDV: 63 MM HG (ref 41–51)
PH BLDV: 7.31 [PH] (ref 7.35–7.45)
PLATELET # BLD AUTO: 159 THOUSANDS/UL (ref 150–450)
PMV BLD AUTO: 8.6 FL (ref 8.9–12.7)
PO2 BLDV: 26 MM HG
POTASSIUM SERPL-SCNC: 5 MMOL/L (ref 3.6–5)
PROT SERPL-MCNC: 9.1 G/DL (ref 5.9–8.4)
RBC # BLD AUTO: 5.09 MILLION/UL (ref 4.5–5.9)
SALICYLATES SERPL-MCNC: <1 MG/DL (ref 10–30)
SODIUM SERPL-SCNC: 143 MMOL/L (ref 137–147)
TROPONIN I SERPL-MCNC: 0.01 NG/ML (ref 0–0.03)
WBC # BLD AUTO: 7.9 THOUSAND/UL (ref 4.5–11)

## 2019-08-07 PROCEDURE — 93005 ELECTROCARDIOGRAM TRACING: CPT

## 2019-08-07 PROCEDURE — 99285 EMERGENCY DEPT VISIT HI MDM: CPT

## 2019-08-07 PROCEDURE — 80320 DRUG SCREEN QUANTALCOHOLS: CPT | Performed by: EMERGENCY MEDICINE

## 2019-08-07 PROCEDURE — 82553 CREATINE MB FRACTION: CPT | Performed by: EMERGENCY MEDICINE

## 2019-08-07 PROCEDURE — 96360 HYDRATION IV INFUSION INIT: CPT

## 2019-08-07 PROCEDURE — 84484 ASSAY OF TROPONIN QUANT: CPT | Performed by: EMERGENCY MEDICINE

## 2019-08-07 PROCEDURE — 82550 ASSAY OF CK (CPK): CPT | Performed by: EMERGENCY MEDICINE

## 2019-08-07 PROCEDURE — 36415 COLL VENOUS BLD VENIPUNCTURE: CPT | Performed by: EMERGENCY MEDICINE

## 2019-08-07 PROCEDURE — 82805 BLOOD GASES W/O2 SATURATION: CPT | Performed by: EMERGENCY MEDICINE

## 2019-08-07 PROCEDURE — 70450 CT HEAD/BRAIN W/O DYE: CPT

## 2019-08-07 PROCEDURE — 96374 THER/PROPH/DIAG INJ IV PUSH: CPT

## 2019-08-07 PROCEDURE — 99284 EMERGENCY DEPT VISIT MOD MDM: CPT | Performed by: EMERGENCY MEDICINE

## 2019-08-07 PROCEDURE — 80053 COMPREHEN METABOLIC PANEL: CPT | Performed by: EMERGENCY MEDICINE

## 2019-08-07 PROCEDURE — 99239 HOSP IP/OBS DSCHRG MGMT >30: CPT | Performed by: NURSE PRACTITIONER

## 2019-08-07 PROCEDURE — 82948 REAGENT STRIP/BLOOD GLUCOSE: CPT

## 2019-08-07 PROCEDURE — 80329 ANALGESICS NON-OPIOID 1 OR 2: CPT | Performed by: EMERGENCY MEDICINE

## 2019-08-07 PROCEDURE — 85025 COMPLETE CBC W/AUTO DIFF WBC: CPT | Performed by: EMERGENCY MEDICINE

## 2019-08-07 RX ORDER — GABAPENTIN 400 MG/1
800 CAPSULE ORAL 3 TIMES DAILY
Qty: 180 CAPSULE | Refills: 0 | Status: SHIPPED | OUTPATIENT
Start: 2019-08-07 | End: 2019-09-06

## 2019-08-07 RX ORDER — LORAZEPAM 2 MG/ML
1 INJECTION INTRAMUSCULAR ONCE
Status: COMPLETED | OUTPATIENT
Start: 2019-08-07 | End: 2019-08-07

## 2019-08-07 RX ORDER — LITHIUM CARBONATE 450 MG
450 TABLET, EXTENDED RELEASE ORAL EVERY 12 HOURS SCHEDULED
Qty: 60 TABLET | Refills: 0 | Status: SHIPPED | OUTPATIENT
Start: 2019-08-07 | End: 2019-09-06

## 2019-08-07 RX ADMIN — LITHIUM CARBONATE 450 MG: 450 TABLET, EXTENDED RELEASE ORAL at 08:33

## 2019-08-07 RX ADMIN — FOLIC ACID 1 MG: 1 TABLET ORAL at 08:32

## 2019-08-07 RX ADMIN — SODIUM CHLORIDE 1000 ML: 0.9 INJECTION, SOLUTION INTRAVENOUS at 22:13

## 2019-08-07 RX ADMIN — GABAPENTIN 800 MG: 400 CAPSULE ORAL at 08:33

## 2019-08-07 RX ADMIN — NICOTINE 1 PATCH: 21 PATCH, EXTENDED RELEASE TRANSDERMAL at 08:33

## 2019-08-07 RX ADMIN — LORAZEPAM 1 MG: 2 INJECTION INTRAMUSCULAR; INTRAVENOUS at 23:38

## 2019-08-07 RX ADMIN — CHLORDIAZEPOXIDE HYDROCHLORIDE 10 MG: 5 CAPSULE ORAL at 08:33

## 2019-08-07 RX ADMIN — Medication 1 TABLET: at 08:33

## 2019-08-07 RX ADMIN — TRAZODONE HYDROCHLORIDE 50 MG: 50 TABLET ORAL at 00:07

## 2019-08-07 NOTE — PLAN OF CARE
Problem: Ineffective Coping  Goal: Cooperates with admission process  Description  Interventions:   - Complete admission process  Outcome: Completed  Goal: Identifies ineffective coping skills  Outcome: Completed  Goal: Identifies healthy coping skills  Outcome: Completed  Goal: Demonstrates healthy coping skills  Outcome: Completed  Goal: Participates in unit activities  Description  Interventions:  - Provide therapeutic environment   - Provide required programming   - Redirect inappropriate behaviors   Outcome: Completed  Goal: Patient/Family participate in treatment and DC plans  Description  Interventions:  - Provide therapeutic environment  Outcome: Completed  Goal: Patient/Family verbalizes awareness of resources  Outcome: Completed  Goal: Understands least restrictive measures  Description  Interventions:  - Utilize least restrictive behavior  Outcome: Completed  Goal: Free from restraint events  Description  - Utilize least restrictive measures   - Provide behavioral interventions   - Redirect inappropriate behaviors   Outcome: Completed     Problem: Risk for Self Injury/Neglect  Goal: Treatment Goal: Remain safe during length of stay, learn and adopt new coping skills, and be free of self-injurious ideation, impulses and acts at the time of discharge  Outcome: Completed  Goal: Verbalize thoughts and feelings  Description  Interventions:  - Assess and re-assess patient's lethality and potential for self-injury  - Engage patient in 1:1 interactions, daily, for a minimum of 15 minutes  - Encourage patient to express feelings, fears, frustrations, hopes  - Establish rapport/trust with patient   Outcome: Completed  Goal: Refrain from harming self  Description  Interventions:  - Monitor patient closely, per order  - Develop a trusting relationship  - Supervise medication ingestion, monitor effects and side effects   Outcome: Completed  Goal: Attend and participate in unit activities, including therapeutic, recreational, and educational groups  Description  Interventions:  - Provide therapeutic and educational activities daily, encourage attendance and participation, and document same in the medical record  - Obtain collateral information, encourage visitation and family involvement in care   Outcome: Completed  Goal: Recognize maladaptive responses and adopt new coping mechanisms  Outcome: Completed  Goal: Complete daily ADLs, including personal hygiene independently, as able  Description  Interventions:  - Observe, teach, and assist patient with ADLS  - Monitor and promote a balance of rest/activity, with adequate nutrition and elimination  Outcome: Completed     Problem: Anxiety  Goal: Anxiety is at manageable level  Description  Interventions:  - Assess and monitor patient's anxiety level  - Monitor for signs and symptoms of anxiety both physical and emotional (heart palpitations, chest pain, shortness of breath, headaches, nausea, feeling jumpy, restlessness, irritable, apprehensive)  - Collaborate with interdisciplinary team and initiate plan and interventions as ordered    - [place] patient to unit/surroundings  - Explain treatment plan  - Encourage participation in care  - Encourage verbalization of concerns/fears  - Identify coping mechanisms  - Assist in developing anxiety-reducing skills  - Administer/offer alternative therapies  - Limit or eliminate stimulants  Outcome: Completed     Problem: Individualized Interventions  Goal: Patient will verbalize appropriate use of telephone within 5 days  Description  Interventions:  - Treatment team to determine use of supervised phone privileges   Outcome: Completed  Goal: Patient will verbalize need for hospitalization and will no longer attempt elopement within 5 days  Description  Interventions:  - Ongoing education to help patient understand need for hospitalization  Outcome: Completed  Goal: Patient will recognize inappropriate behaviors and develop alternative behaviors within 5 days  Description  Interventions:  - Patient in collaboration with Treatment Team will develop a behavior management plan to help identify effective coping skills to deal with stressors  Outcome: Completed     Problem: Ineffective Coping  Goal: Participates in unit activities  Description  Interventions:  - Provide therapeutic environment   - Provide required programming   - Redirect inappropriate behaviors   Outcome: Completed     Problem: DISCHARGE PLANNING - CARE MANAGEMENT  Goal: Discharge to post-acute care or home with appropriate resources  Description  INTERVENTIONS:  - Conduct assessment to determine patient/family and health care team treatment goals, and need for post-acute services based on payer coverage, community resources, and patient preferences, and barriers to discharge  - Address psychosocial, clinical, and financial barriers to discharge as identified in assessment in conjunction with the patient/family and health care team  - Arrange appropriate level of post-acute services according to patient's   needs and preference and payer coverage in collaboration with the physician and health care team  - Communicate with and update the patient/family, physician, and health care team regarding progress on the discharge plan  - Arrange appropriate transportation to post-acute venues   Outcome: Completed     Problem: SUBSTANCE USE/ABUSE  Goal: By discharge, will develop insight into their chemical dependency and sustain motivation to continue in recovery  Description  INTERVENTIONS:  - Attends all daily group sessions and scheduled AA groups  - Actively practices coping skills through participation in the therapeutic community and adherence to program rules  - Reviews and completes assignments from individual treatment plan  - Assist patient development of understanding of their personal cycle of addiction and relapse triggers  Outcome: Completed  Goal: By discharge, patient will have ongoing treatment plan addressing chemical dependency  Description  INTERVENTIONS:  - Assist patient with resources and/or appointments for ongoing recovery based living  Outcome: Completed

## 2019-08-07 NOTE — SOCIAL WORK
SW called [email or web address] to inquire about pt's possible admission to rehab today  Their admissions rep related that pt is not accepted due to pt's current functioning that they assess as currently not being appropriate for admission into their program     SW met with pt to inform him of Enrico Suarez's decision regarding their non-acceptance of his request for admission, at this time  Pt was irritable and declined to rescind 72-Hour Notice and stated that he wants discharge "now" that he understood as being on AMA basis  Pt declined SW's offer to call other rehab providers for purpose of seeking admission to other programs  Pt angrily demanded that SW assist him to discharge to shelter, regarding which he recognized [employer] [facility] as an alternative that he chose  SW explained need for pt to obtain voucher at [facility], regarding which pt appeared aware, having stated that he has been at this shelter in the past   SW explained that discharge cannot occur "now" because discharge process must be completed and since taxi is not available "now "  AVINASH noted that pt will be discharged prior to expiration of 72-Hour Notice  [facility] and [facility] thereafter  SW read Medicare Rights to pt who declined to appeal discharge  Pt signed and SW faxed same      SW called taxi for pt's transport that was arranged for 1 pm

## 2019-08-07 NOTE — DISCHARGE INSTR - APPOINTMENTS
The treatment recommends that you obtain substance abuse counseling, especially with regard to your chronic alcoholism, medication management, individual therapy, and intensive case management  You stated that you do not want any referrals for substance abuse counseling, and / or for mental health services, at present  You have declined any referrals and that any appointments be scheduled in your behalf to obtain any substance abuse counseling and / or mental health services, including medication management, individual therapy, and / or intensive case management  Thus, your summary of care will not be faxed to any provider for continuity of care  You have stated that you do not know if you have a primary care physician and that you do not want an appointment scheduled in your behalf with a primary care physician  Thus, your summary of care will not be faxed to any provider for continuity of care

## 2019-08-07 NOTE — PROGRESS NOTES
Pt intrusive at desk, demanding DC  Irritable  Stating that "things are gonna get real bad around here if I don't get to leave " Labile  Shows no insight into illness  Compliant with meals and meds  Denies SI, HI, A/T/V  Pt is scheduled for DC today  Monitoring continues

## 2019-08-07 NOTE — BH TRANSITION RECORD
Contact Information: If you have any questions, concerns, pended studies, tests and/or procedures, or emergencies regarding your inpatient behavioral health visit  Please contact Robi Keys" [facility] behavioral health unit [phone] and ask to speak to a , nurse or physician  A contact is available 24 hours/ 7 days a week at this number  Summary of Procedures Performed During your Stay:  Below is a list of major procedures performed during your hospital stay and a summary of results:  - No major procedures performed  Pending Studies (From admission, onward)    None        If studies are pending at discharge, follow up with your PCP and/or referring provider

## 2019-08-07 NOTE — DISCHARGE SUMMARY
Discharge Summary - [address] 44 y o  male MRN: [id number]  Unit/Bed#: -01 Encounter: [id number]     Admission Date: 8/4/2019         Discharge Date: 08/07/2019    Attending Psychiatrist: Rolo Tan MD    Reason for Admission/HPI: Principal Problem:    Bipolar disorder [facility])  Active Problems:    Alcohol withdrawal syndrome, with delirium ([address] )    Current smoker    Bradycardia    Other specified hypothyroidism    Elevated LFTs    Dionne Panda is a 78-year-old male patient admitted on a voluntary 201 commitment basis due to suicidal ideation in the presence of alcohol intoxication  Per emergency room evaluation completed at [facility] by Dr Jenkins Ards:    "48y  o male with PMH of bipolar presents to the ER for alcohol intoxication  History if very limited due to alcohol intoxication  Patient states he is here for alcohol detox  Patient states he was just at [place] last week  He states he has been drinking today  He admits to drinking beer about a 6 pack  He states he normally drinks more than a 6 pack  Patient states he does withdraw from alcohol and does have seizures  He admits to [facility] telling him to kill himself  Patient admits to [facility] with a plan to jump off a bridge  He denies VH or HI  He denies fever, chills, chest pain, dyspnea, N/V/D, abdominal pain, weakness or paresthesias  Patient denies drug use but does admit to smoking 1 pack of cigarettes per day "    Per initial psychiatric evaluation completed by Dr Rolo Tan upon admission to the psychiatric unit:    "Patient is a 44 y o  male presents on a 201 after he was brought to the emergency room by the police intoxicated  Patient who is unreliable historian reports he was walking down the street and the police picked him up  In the ER he has a blood alcohol level of 120, and was positive for cocaine and benzodiazepines  Patient seems very agitated and fixated on leaving today    Patient does report history of withdrawal seizures and DTs  He did report that he had to be in the ICU for withdrawal symptoms before  Patient is uncooperative, yelling, screaming and cursing  He did sign a 72 hour to withdraw from treatment "    Rogelio Marquez has a psychiatric history of bipolar disorder and alcohol dependence and has been hospitalized once for alcohol rehabilitation at [facility] in [address] [place] [address]  He is not currently receiving any outpatient treatment  Hospital Course:   Rogelio Marquez was admitted to the [facility] unit after being medically cleared  Once on the unit, he was seen by medical doctor for physical examination  He was placed on 7 minutes behavioral health checks for patient safety  He was encouraged to attend both group and occupational therapy  Psychiatric medication was initiated and titrated to appropriate doses  Before any medication was administered, risk versus benefit was discussed  Rogelio Marquez agreed to take medications as prescribed and participate in treatment while in the hospital     Initially after being admitted, Rogelio Marquez was belligerent, loud, and appeared to be withdrawing from alcohol  A Librium taper was prescribed per protocol  Lithium was also initiated at 450 mg every 12 hours for mood control  Throughout the course of his admission, Rogelio Marquez remained uncooperative, disruptive, loud, and demanding  He signed a 72 hour notice and demanded to be discharged  The Psychiatric Care Team recommended he receive alcohol rehabilitation on an inpatient basis  The care team also recommended medication management and psychotherapy  Rogelio Marquez continue to refuse and stated that he was going to make things very bad for everyone on the unit if not discharged  Although he remained resistant to treatment, Phil's mood did stabilize somewhat on the lithium  He eventually became less aggressive and his sleep and hygiene improved    His appetite and sleep returned to normal limits  The care team felt he would definitely benefit from further treatment but because he did not meet criteria for an involuntary commitment, the Psychiatric Care team discharged him against medical advice  On the day of discharge, Althea De La Cruz denied any suicidal or homicidal ideation as well as any auditory or visual hallucinations  He was experiencing no symptoms of alcohol withdrawal   His mood was improved   He denied any feelings of anger, aggression, irritability, or depression  He related he did not wish to receive any further treatment and did not wish to pursue treatment once discharged  He left the hospital in stable condition and was transported to [facility] per his request       Mental Status at time of Discharge:     Appearance:  casually dressed   Behavior:  normal   Speech:  normal pitch and normal volume   Mood:  stable   Affect:  normal   Thought Process:  normal   Thought Content:  normal   Perceptual Disturbances: None   Risk Potential: Patient denies any suicidal or homicidal ideation  Sensorium:  person, place, time/date and situation   Cognition:  grossly intact   Consciousness:  alert and awake    Attention: attention span appeared shorter than expected for age   Insight:  poor   Judgment: poor   Gait/Station: normal gait/station and normal balance   Motor Activity: no abnormal movements     Admission Diagnosis:Major depressive disorder, single episode, severe without psychotic features [F32 2]    Discharge Diagnosis:   Principal Problem:    Bipolar disorder ([facility] )  Active Problems:    Alcohol withdrawal syndrome, with delirium ([facility] )    Current smoker    Bradycardia    Other specified hypothyroidism    Elevated LFTs  Resolved Problems:    * No resolved hospital problems   *        Lab results:  Admission on 08/04/2019   Component Date Value    RPR 08/05/2019 Non-Reactive     TSH 3RD GENERATON 08/05/2019 1 830     WBC 08/05/2019 6 60     RBC 08/05/2019 4 56     Hemoglobin 08/05/2019 15 5     Hematocrit 08/05/2019 44 7     MCV 08/05/2019 98     MCH 08/05/2019 33 9     MCHC 08/05/2019 34 6     RDW 08/05/2019 13 3     MPV 08/05/2019 9 0     Platelets 32/22/4789 127*    Neutrophils Relative 08/05/2019 44     Lymphocytes Relative 08/05/2019 43     Monocytes Relative 08/05/2019 9     Eosinophils Relative 08/05/2019 4     Basophils Relative 08/05/2019 1     Neutrophils Absolute 08/05/2019 2 90     Lymphocytes Absolute 08/05/2019 2 80     Monocytes Absolute 08/05/2019 0 60     Eosinophils Absolute 08/05/2019 0 20     Basophils Absolute 08/05/2019 0 00     Sodium 08/05/2019 137     Potassium 08/05/2019 3 8     Chloride 08/05/2019 109*    CO2 08/05/2019 26     ANION GAP 08/05/2019 2*    BUN 08/05/2019 16     Creatinine 08/05/2019 0 79     Glucose 08/05/2019 103*    Glucose, Fasting 08/05/2019 103*    Calcium 08/05/2019 9 1     AST 08/05/2019 57*    ALT 08/05/2019 70*    Alkaline Phosphatase 08/05/2019 33*    Total Protein 08/05/2019 6 8     Albumin 08/05/2019 3 6     Total Bilirubin 08/05/2019 0 40     eGFR 08/05/2019 104     Cholesterol 08/05/2019 153     Triglycerides 08/05/2019 195*    HDL, Direct 08/05/2019 40     LDL Calculated 08/05/2019 74     Non-HDL-Chol (CHOL-HDL) 08/05/2019 113     Ventricular Rate 08/05/2019 48     Atrial Rate 08/05/2019 48     NM Interval 08/05/2019 152     QRSD Interval 08/05/2019 98     QT Interval 08/05/2019 448     QTC Interval 08/05/2019 400     P Axis 08/05/2019 68     QRS Axis 08/05/2019 38     T Wave Axis 08/05/2019 49        Discharge Medications:  Current Discharge Medication List      START taking these medications    Details   gabapentin (NEURONTIN) 400 mg capsule Take 2 capsules (800 mg total) by mouth 3 (three) times a day for 30 days  Qty: 180 capsule, Refills: 0    Associated Diagnoses: Bipolar disorder ([address] )            Current Discharge Medication List      STOP taking these medications cloNIDine (CATAPRES) 0 1 mg tablet Comments:   Reason for Stopping:         gabapentin (NEURONTIN) 600 MG tablet Comments:   Reason for Stopping:         hydrOXYzine HCL (ATARAX) 50 mg tablet Comments:   Reason for Stopping:         thiamine (VITAMIN B1) 100 mg tablet Comments:   Reason for Stopping:         traZODone (DESYREL) 100 mg tablet Comments:   Reason for Stopping:              Current Discharge Medication List      CONTINUE these medications which have CHANGED    Details   lithium carbonate (LITHOBID) 450 mg CR tablet Take 1 tablet (450 mg total) by mouth every 12 (twelve) hours for 30 days  Qty: 60 tablet, Refills: 0    Associated Diagnoses: Bipolar disorder ([facility] )            Current Discharge Medication List      CONTINUE these medications which have NOT CHANGED    Details   levothyroxine 125 mcg tablet Take 125 mcg by mouth              Discharge instructions/Information to patient and family:   See after visit summary for information provided to patient and family  Provisions for Follow-Up Care:  See after visit summary for information related to follow-up care and any pertinent home health orders  Discharge Statement   I spent 40 minutes discharging the patient  This time was spent on the day of discharge  I had direct contact with the patient on the day of discharge  Additional documentation is required if more than 30 minutes were spent on discharge

## 2019-08-07 NOTE — PROGRESS NOTES
Patient out in the milieu keeps to himself  Ate HS snack  Upon approach patient's mood and affect is constricted and irritable  Patient has pressured speech and has no insight  Rates anxiety and depression 8/10  Patient ruminates about his D/C  Patient minimizes his drinking and is not ready to get sober  Took HS medications without difficulty  Will continue to monitor safety and behaviors every 7 minutes

## 2019-08-07 NOTE — DISCHARGE INSTR - OTHER ORDERS
You will discharge to the [facility], [address] [facility] [place], [address]; Phone:  [phone]  Crisis Plan:    Due to your reported memory loss, you were unable to identify triggers that led to your hospitalization  Coping skills you identified during your hospital stay include music  After discharge, if you find your coping skills are not effective and you continue to feel distressed, please contact staff at the [facility]  If that is not effective and you feel you are a danger to yourself or others, please contact [facility] [address]: [phone], National Suicide Prevention Lifeline:  9-224.710.6969, Peer Support Talk Line (Seven days a week, 1:00 PM  9:00 PM Call: 422.884.5236 or Text: [phone]),  Alcohol Anonymous: 801.532.6204, National [place] on [address] [place] [address] ([facility]) HELPLINE: 140.129.2481/Email: www charles  org, or Substance Abuse and [place] [address] [place] [address], which is a confidential, free, 24-hour-a-day, 365-day-a-year, information service for individuals and family members facing mental health and/or substance use disorders  This service provides referrals to local treatment facilities, support groups, and community-based organizations  Callers can also order free publications and other information    Call 2-663.490.9859/Email: www [facility] gov    Robi [address]: [phone]  Peer Hotline (M-F 6-10pm): 3-895.769.3951   Alcohol Anonymous: 407.950.9265

## 2019-08-07 NOTE — PROGRESS NOTES
Patient out to the desk complaining of insomnia  Gave PRN Trazodone for symptoms  Will continue to monitor safety and behaviors every 7 minutes

## 2019-08-07 NOTE — PROGRESS NOTES
DC instructions reviewed; pt verbalized understanding  Belongings returned; Rxs given   Pt picked up by cab provided by hospital

## 2019-08-07 NOTE — SOCIAL WORK
AVINASH met with pt to discuss discharge planning, regarding which he agreed to seek IP rehab for alcoholism to which he admits  Pt agreed to sign [facility]AK for [facility] regarding which SW facilitated his call to [address], [phone], to whom he verbalized desire for admission  SW explained pt's request for admission, ASAP; pt needs dual [place] facility  Eric Welsh stated that there is no immediate vacancy and it is likely that there will not be any available until the end of this week  She provided SW will contact information for three other providers, regarding which pt requested that SW call INNOBI at [facility],  Admissions Rep Siva Woodard noted that there are vacancies in their dual [address] [address] rehab regarding which pt made request to Siva Woodard for admission  Pt declined to call any other rehab provider  SW faxed / emailed pt's requisite documentation to [employer] and will await response

## 2019-08-07 NOTE — DISCHARGE INSTRUCTIONS
Abuse of Alcohol   WHAT YOU NEED TO KNOW:   · Alcohol abuse is unhealthy drinking behavior  You may drink too much at one time once a week, or continue to drink too much daily  You continue to drink even though it causes problems  The problems can be alcohol related legal problems, or problems with work or relationships with family  · If you drink too much at one time, you are binge drinking  Binge drinking is when you have a large amount of alcohol in a short time  Your blood alcohol concentrations (JEF) goes above 0 08 g/dLlevel during binge drinking  For men, this usually happens with more than 4 drinks in 2 hours  For women, it is more than 3 drinks in 2 hours  A drink is 12 ounces of beer, 4 ounces of wine, or 1½ ounces of liquor  DISCHARGE INSTRUCTIONS:   Call 911 for the following:   · You have sudden chest pain or trouble breathing  · You have a seizure or have shaking or trembling  · You were in an accident because of alcohol  Seek care immediately if:   · You want to harm yourself or others  · You have hallucinations (you see or hear things that are not real)  · You cannot stop vomiting or you vomit blood  Contact your healthcare provider if:   · You need help to stop drinking alcohol  · You have questions or concerns about your condition or care  Medicines:   · Vitamin supplements  may be given to treat low vitamin levels  Alcohol can make it hard for your body to absorb enough vitamins such as B1  Vitamin supplements may also be given to prevent alcohol related brain damage  · Take your medicine as directed  Contact your healthcare provider if you think your medicine is not helping or if you have side effects  Tell him or her if you are allergic to any medicine  Keep a list of the medicines, vitamins, and herbs you take  Include the amounts, and when and why you take them  Bring the list or the pill bottles to follow-up visits   Carry your medicine list with you in case of an emergency  Treatments or therapies you may need:   · Detoxification (detox) and withdrawal  is a program that helps you to safely get alcohol out of your body  Detox can also help get rid of the physical need to drink  Healthcare providers monitor the physical symptoms of withdrawal  They may give you medicines to help decrease nausea, dehydration, and seizures  Healthcare providers will also monitor your blood pressure, heart and breathing rates, and your temperature  Symptoms of anxiety, depression, and suicidal thoughts are also monitored and managed during detox  Healthcare providers may give you medicines for these symptoms and therapy sessions will be available to you  Detox is usually done at a detox center or in a hospital  Healthcare providers do not recommend that you try to detox at home or by yourself  Withdrawal symptoms may become life-threatening  The center can help you find 12 step programs or an individual therapist to help with emotional support after detox  · Inpatient and outpatient treatment  focus on your personal needs to help you stop drinking  Treatment helps you understand the reasons you abuse alcohol  Counselors and therapists provide you with support and help you find ways to cope instead of drinking  You may need inpatient treatment to provide a controlled environment  You may need outpatient treatment after your inpatient treatment is complete  · Alcohol aversion therapy  takes away the desire to drink by causing a negative reaction when you drink  Healthcare providers may give you medicines that cause nausea and vomiting when you drink alcohol  They may instead give you a medicine that decreases your urge to drink alcohol  These medicines are used to help you stop drinking or reduce the amount you drink  They can also help you avoid relapse  Follow up with your healthcare provider as directed:  Write down your questions so you remember to ask them during your visits    Avoid alcohol:  You should stop drinking entirely  Alcohol can damage your brain, heart, and liver  It also increases your risk for injury, high blood pressure, and certain types of cancer  Alcohol is dangerous when you combine it with certain medicines  Do not drive if you drink alcohol:  Make sure someone who has not been drinking can help you get home  Get support:  Most people need support to stop drinking alcohol  Mental health providers, support groups, rehabilitation centers, and your healthcare provider can provide support  For more information:   · Alcoholics Anonymous  Web Address: http://Gear4music.com/  · Substance Abuse and [address] , [address] [place]  Web Address: https://Cybronics/  © 2017 [address] Information is for End User's use only and may not be sold, redistributed or otherwise used for commercial purposes  All illustrations and images included in CareNotes® are the copyrighted property of A D A M , Inc  or Conner Wong  The above information is an  only  It is not intended as medical advice for individual conditions or treatments  Talk to your doctor, nurse or pharmacist before following any medical regimen to see if it is safe and effective for you  Alcohol Withdrawal   WHAT YOU NEED TO KNOW:   Alcohol withdrawal is a group of symptoms that occur when you drink alcohol daily and suddenly stop drinking  It can begin within 5 hours of your last drink and gets worse over 2 to 3 days  Withdrawal may also happen if you suddenly reduce the amount of alcohol that you normally drink  DISCHARGE INSTRUCTIONS:   Call 911 for any of the following:   · You feel like you want to harm yourself or others  Seek care immediately if:   · You have sudden chest pain or trouble breathing  · Your breathing or heartbeat is faster than usual     · You pass out or think you had a seizure      · You are confused, hallucinating, or extremely agitated  · You cannot stop vomiting, or you vomit blood  · You are shaking and it does not get better after you take your medicine  Contact your healthcare provider if:   · You keep drinking to avoid alcohol withdrawal symptoms  · You need help to stop drinking alcohol  · You have trouble with work, relationships, or school because you drink too much alcohol  · You get into fights because of alcohol  · You have questions or concerns about your condition or care  Medicines:   · Medicines  may be given to calm you and help manage your symptoms  Vitamin supplements, such as thiamine, may be recommended because high alcohol intake can keep your body from absorbing enough vitamins from food  · Take your medicine as directed  Contact your healthcare provider if you think your medicine is not helping or if you have side effects  Tell him or her if you are allergic to any medicine  Keep a list of the medicines, vitamins, and herbs you take  Include the amounts, and when and why you take them  Bring the list or the pill bottles to follow-up visits  Carry your medicine list with you in case of an emergency  Have someone stay with you during withdrawal:  This person should help you take your medicine and keep you in a calm, quiet environment  He should also watch your symptoms and know what to do if your symptoms get worse  Follow up with your healthcare provider within 1 day:  Write down your questions so you remember to ask them during your visits  Learn to stop drinking alcohol safely:  Work with your healthcare provider to develop a plan for you to stop drinking safely  A sudden stop or change can be life-threatening  For support and more information:   · Alcoholics Anonymous  Web Address: http://www elmore info/  © 2017 [address] Information is for End User's use only and may not be sold, redistributed or otherwise used for commercial purposes  All illustrations and images included in CareNotes® are the copyrighted property of [employer]  or Conner Wong  The above information is an  only  It is not intended as medical advice for individual conditions or treatments  Talk to your doctor, nurse or pharmacist before following any medical regimen to see if it is safe and effective for you  Bipolar Disorder   WHAT YOU NEED TO KNOW:   Bipolar disorder is a long-term chemical imbalance that causes rapid changes in mood and behavior  High moods are called paula  Low moods are called depression  Sometimes you will feel manic and sometimes you will feel depressed  You can have alternating episodes of paula and depression  This is called a mixed bipolar state  DISCHARGE INSTRUCTIONS:   Call 911 if:   · You think about hurting yourself or someone else  Contact your healthcare provider or psychiatrist if:   · You are having trouble managing your bipolar disorder  · You cannot sleep, or are sleeping all the time  · You cannot eat, or are eating more than usual     · You feel dizzy or your stomach is upset  · You cannot make it to your next meeting  · You have questions or concerns about your condition or care  Medicines:   · Medicines  may be given to help keep your mood stable, or to help you sleep  Changes in medicine are often needed as your bipolar disorder changes  · Take your medicine as directed  Contact your healthcare provider if you think your medicine is not helping or if you have side effects  Tell him or her if you are allergic to any medicine  Keep a list of the medicines, vitamins, and herbs you take  Include the amounts, and when and why you take them  Bring the list or the pill bottles to follow-up visits  Carry your medicine list with you in case of an emergency    Follow up with your healthcare provider or psychiatrist as directed:  Write down your questions so you remember to ask them during your visits  Manage bipolar disorder:  Watch for triggers of bipolar disorder symptoms, such as stress  Learn new ways to relax, such as deep breathing, to manage your stress  Tell someone if you feel a manic or depressive period might be coming on  Ask a friend or family member to help watch you for bipolar symptoms  Work to develop skills that will help you manage bipolar disorder  You may need to make lifestyle changes  Ask your healthcare provider or psychiatrist for resources  For support and more information:   · [address] [facility], [address] [facility], [address] [facility], [address]  [place] [address]   Phone: [phone]  Phone: [phone]  Web Address: Anurag tn  · Depression and [address] ([facility])  [address] [place] [address], [address] [facility] , [address]  Phone: [phone]  Web Address: [employer]   © 2017 [facility] Information is for End User's use only and may not be sold, redistributed or otherwise used for commercial purposes  All illustrations and images included in CareNotes® are the copyrighted property of [employer] , Inc  or Conner Wong  The above information is an  only  It is not intended as medical advice for individual conditions or treatments  Talk to your doctor, nurse or pharmacist before following any medical regimen to see if it is safe and effective for you  Psychotic Disorder   WHAT YOU NEED TO KNOW:   A psychotic disorder is a medical condition that causes hallucinations and delusions  Hallucinations are seeing, hearing, tasting, or feeling things that are not real  Delusions are beliefs that something is real, true, or right when it is not  These false beliefs do not go away even if there is proof that they are not true  You may believe someone is spying on you, after you, or controlling your mind   You may also believe there is something wrong with how your body works  Schizophrenia and schizoaffective disorder are examples of psychotic disorders  DISCHARGE INSTRUCTIONS:   Call 911 if:   · You feel like you could harm yourself or someone else  Contact your healthcare provider if:   · Your symptoms do not improve  · You cannot make it to your next appointment  · You have new symptoms  · You have questions or concerns about your condition or care  Medicines  may be given to decrease your symptoms  You may need 1 or more medicines  You may need to take your medicine for several weeks before you begin to feel better  Tell your healthcare provider about any side effects or problems you have with your medicines  The type or amount of medicine may need to be changed  Get support: It may be difficult to cope with your illness  You may feel lonely, anxious, or depressed  It may help to join a support group  A support group lets you talk with others who have a mental illness  For information and more support visit:  · [facility]  [address]  [facility]  , [address] [facility] , [address] [place] [address]  Phone: [phone]  Phone: [phone]  Web Address: http://Peppercoin  Self-care:   · Do not drink alcohol or use illegal drugs  Alcohol and illegal drugs can make your symptoms worse  Ask your healthcare provider for information if you currently drink alcohol or use illegal drugs and need help to quit  · Do not smoke  Nicotine and other chemicals in cigarettes and cigars can cause lung damage  They can also decrease how well your medicine works  Ask your healthcare provider for information if you currently smoke and need help to quit  E-cigarettes or smokeless tobacco still contain nicotine  Talk to your healthcare provider before you use these products  · Exercise regularly  Exercise can help improve your mood and decrease symptoms  Ask about the best exercise plan for you  · Manage your stress  Stress can make your condition worse  Ask your healthcare provider for more information about practicing mindfulness and deep breathing exercises to help decrease your stress  You may learn other ways to manage stress during therapy  Follow up with your healthcare provider as directed:  Write down your questions so you remember to ask them during your visits  © 2017 [address] Information is for End User's use only and may not be sold, redistributed or otherwise used for commercial purposes  All illustrations and images included in CareNotes® are the copyrighted property of A D A M , Inc  or Conner Wong  The above information is an  only  It is not intended as medical advice for individual conditions or treatments  Talk to your doctor, nurse or pharmacist before following any medical regimen to see if it is safe and effective for you  Suicide Prevention for Adults   WHAT YOU NEED TO KNOW:   A person may see suicide as the only way to escape emotional or physical pain and suffering  You can help provide emotional support for him or her and get the help he or she needs  Learn to recognize warning signs that the person may be considering suicide  Find resources to help prevent him or her from attempting to take his or her life  DISCHARGE INSTRUCTIONS:   Call 911 if:   · The person has done something on purpose to hurt himself or herself  · The person tries to commit suicide  Seek care immediately if:   · The person tells you he or she made a plan to commit suicide  · The person acts out in anger, is reckless, or is abusing alcohol or drugs  · The person has serious thoughts of suicide, even with treatment  Contact the person's healthcare provider or therapist if:   · You begin to see warning signs that the person may be considering suicide      · The person has intense feelings of sadness, anger, revenge, or despair, or he cannot make decisions easily  · The person tells you he or she has more thoughts of suicide when they are alone  · The person withdraws from others  · The person stops eating, or begins to smoke or drink heavily  · The person feels he or she is a burden because of a disability or disease  · The person has trouble dealing with stress, such as a breakup or a job loss  · You have questions or concerns about the person's condition or care  What to do if the person is having thoughts of suicide:  Call 911 if you feel the person is at immediate risk of suicide, or if he or she talks about an active suicide plan  Assume that the person intends to carry out his or her plan  Resources are available to help you and the person  The following are some things you can do:  · Call the [address] [place] [address] at [phone] ([phone])   · Call the Suicide Hotline at 3-375-RLEDOIQ (0-339.746.1401)   · Contact the person's therapist  His or her healthcare provider can give you a list of therapists if he or she does not have one  · Keep medicines, weapons, and alcohol out of the person's reach  Do not leave the person alone if he or she says they want to commit suicide  Do not leave the person alone if you think he or she may try it  Make sure you do not put yourself at risk if the person has a weapon  · Do not be afraid to ask if the person is thinking of ending his or her life  Ask if the person has a plan for hurting or killing himself or herself    Warning signs to watch for:   · Talking about a plan for committing suicide, or suddenly deciding to make a will    · Cutting himself or herself, burning the skin with cigarettes, or driving recklessly    · Drug or alcohol use, not taking prescribed medicine, or taking too much prescribed medicine    · Sudden anger, lashing out at others, or seeming hopeless, anxious, or angry and then suddenly becoming happy or peaceful    · Not wanting to spend time with others or doing things he or she usually enjoys    · Trouble at work, or not showing up for work    · A change in the way he or she eats, sleeps, or dresses    · Weight gain or loss or having less energy than usual    · Trouble sleeping or spending a lot of time sleeping    · Giving away or throwing away his or her belongings  Treatments the person may need:   · Medicines  may be given to prevent mood swings, or to decrease anxiety or depression  The person will need to take all medicines as directed  A sudden stop can be harmful  It may take 4 to 6 weeks for the medicine to help him or her feel better  · A therapist  can help the person identify and change negative feelings or beliefs about himself or herself  This may also help change the way the he or she feels and acts  A therapist can also help the person find ways to cope with things that cannot be changed  Follow up with the person's healthcare provider and therapist as directed:  Write down your questions so you remember to ask them during your visits  What you can do to help the person:   · Encourage the person to seek help for drug or alcohol abuse  Drugs and alcohol can increase suicidal thoughts and make the person more likely to act on them  · Help the person connect with others  Encourage him or her to become involved in the community  Some examples include tutoring a young student, volunteering at a [employer], or joining a group exercise program  The person may need help setting up a computer or creating an e-mail account to help him or her remain connected to others  · Exercise with the person  Exercise can lift his or her mood, increase energy, and make it easier to sleep at night  · Encourage the person to try new things  Adults who are open to new experiences handle stress and change better than those who are not  · Call, visit, or send postcards to the person often    Check on him or her after the loss of a pet, longtime friend, or child  Holidays, birthdays, and anniversaries can be difficult for a person after a loss  The loss of a spouse can be especially painful and lonely  · Help the person schedule a visit with his or her Yazidism or spiritual leader  A Yazidism or spiritual leader may be able to offer additional support and resources to the person  · Help the person get equipment that will increase his or her comfort and mobility  Examples are hearing aids, glasses, large print books, and walkers  These can help him or her enjoy activities and feel more independent  · Encourage the person to continue taking medicine and going to therapy  Medicine and therapy can help improve his or her mental health  For support and more information:   · [facility] , [address] [place] [address]  Phone: [phone] (01 [phone])  Web Address: [employer]  · Suicide Awareness Vo[employer] of Education  [address] [place] [address] [address] , [address]  Phone: 8- 261 [phone]  Web Address: MediaHound  org  © 2017 [address] [facility] Information is for End User's use only and may not be sold, redistributed or otherwise used for commercial purposes  All illustrations and images included in CareNotes® are the copyrighted property of [employer] Inc  or Conner Wong  The above information is an  only  It is not intended as medical advice for individual conditions or treatments  Talk to your doctor, nurse or pharmacist before following any medical regimen to see if it is safe and effective for you  Levothyroxine (By mouth)   Levothyroxine ([id number])  Treats hypothyroidism  Also treats an enlarged thyroid gland and thyroid cancer  Brand Name(s): Levoxyl, Synthroid, Tirosint, Unithroid   There may be other brand names for this medicine  When This Medicine Should Not Be Used:    This medicine is not right for everyone  Do not use it if you had an allergic reaction to glycerol, or you recently had a heart attack  How to Use This Medicine:   Capsule, Liquid, Tablet  · Take your medicine as directed  Your dose may need to be changed several times to find what works best for you  You may have to take this medicine for 6 to 8 weeks before your symptoms start to get better  · Read and follow the patient instructions that come with this medicine  Talk to your doctor or pharmacist if you have any questions  · Take this medicine in the morning on an empty stomach  Wait at least 30 to 60 minutes before you eat any food  · Capsule: Swallow whole  Do not cut or crush it  · Oral liquid:   ¨ This medicine may be mixed with water or be given directly into the mouth  ¨ If mixed with water: Squeeze the contents of 1 single unit-dose ampule into a glass or cup containing water  Stir and drink it immediately  Add some water to the glass or cup and drink the water  This will help get all of the medicine out of the glass or cup  Do not mix this medicine with any other liquid except water  Do not store the mixture for later use  ¨ If taken without water: Squeeze the medicine directly into the mouth or into a spoon and swallow it immediately  · Tablet: If this medicine is being given to a baby or a small child, you can crush the tablet and mix it in 1 to 2 teaspoons (5 to 10 milliliters) of water  This mixture can be given by spoon or dropper  Do not mix the tablet with any other liquid except water  Do not store the mixture  If you do not give the medicine right after it is mixed, throw it away  · Missed dose: Take a dose as soon as you remember  If it is almost time for your next dose, wait until then and take a regular dose  Do not take extra medicine to make up for a missed dose  · Store the medicine in a closed container at room temperature, away from heat, moisture, and direct light   Use the oral liquid within 15 days after opening the pouch  Keep the ampules in the pouch until you are ready to use them  Drugs and Foods to Avoid:   Ask your doctor or pharmacist before using any other medicine, including over-the-counter medicines, vitamins, and herbal products  · Some foods and medicines can affect how levothyroxine works  Tell your doctor if you are using any of the following:  ¨ Amiodarone, dexamethasone, digoxin, imatinib, ketamine, phenobarbital, rifampin  ¨ Beta-blocker medicine  ¨ Blood thinner (including heparin, warfarin)  ¨ Insulin or diabetes medicine  ¨ Medicine to treat depression  · If you use antacids, medicine to treat high cholesterol (including cholestyramine, colesevelam, colestipol), orlistat, sevelamer, sucralfate, stomach medicine (including lansoprazole, omeprazole, pantoprazole), or any medicine that contains calcium or iron, take it at least 4 hours before or 4 hours after you take levothyroxine  · Cottonseed meal, dietary fiber, soybean flour (infant formula), or walnuts may decrease the absorption of this medicine  Talk with your doctor if you have questions  · Do not eat grapefruit or drink grapefruit juice while you are using this medicine  Warnings While Using This Medicine:   · Tell your doctor if you are pregnant or breastfeeding, or if you have anemia, blood clotting problems, diabetes, heart disease, osteoporosis, pituitary gland problems, or adrenal gland problems  Tell your doctor if you have recently received radiation therapy with iodine  Do not use this medicine to treat obesity or to lose weight  · Tell any doctor or dentist who treats you that you take this medicine  · Do not stop using this medicine suddenly  Your doctor will need to slowly decrease your dose before you stop it completely  · Your doctor will do lab tests at regular visits to check on the effects of this medicine  Keep all appointments  · Keep all medicine out of the reach of children   Never share your medicine with anyone  Possible Side Effects While Using This Medicine:   Call your doctor right away if you notice any of these side effects:  · Allergic reaction: Itching or hives, swelling in your face or hands, swelling or tingling in your mouth or throat, chest tightness, trouble breathing  · Chest pain that may spread, trouble breathing, unusual sweating, fainting  · Fast, pounding, or uneven heartbeat  · Seizures or tremors  · Severe headache, blurred or double vision, nausea, vomiting (in children)  · Walking with a limp, knee or hip pain (in children)  If you notice these less serious side effects, talk with your doctor:   · Appetite or weight changes  · Changes in your menstrual periods  · Hair loss  · Nervousness, sensitivity to heat, sweating  If you notice other side effects that you think are caused by this medicine, tell your doctor  Call your doctor for medical advice about side effects  You may report side effects to FDA at 4-333-FDA-3998  © 2017 [facility] Information is for End User's use only and may not be sold, redistributed or otherwise used for commercial purposes  The above information is an  only  It is not intended as medical advice for individual conditions or treatments  Talk to your doctor, nurse or pharmacist before following any medical regimen to see if it is safe and effective for you  Gabapentin (By mouth)   Gabapentin (pavel-a-PEN-tin)  Treats seizures and pain caused by shingles  Brand Name(s): ACTIVE-PAC with Gabapentin, Convenience Boris, Cyclo/Brennan 10/300 Pack, FusePaq Fanatrex, Brennan-V, Gralise, [address] Pack, Neurontin, SmartRx Brennan Kit   There may be other brand names for this medicine  When This Medicine Should Not Be Used: This medicine is not right for everyone  Do not use it if you had an allergic reaction to gabapentin  How to Use This Medicine:   Capsule, Liquid, Tablet  · Take your medicine as directed   Your dose may need to be changed several times to find what works best for you  If you have epilepsy, do not allow more than 12 hours to pass between doses  · Capsule: Swallow the capsule whole with plenty of water  Do not open, crush, or chew it  · Gralise® tablet: Swallow the tablet whole   Do not crush, break, or chew it  · Neurontin® tablet: If you break a tablet into 2 pieces, use the second half as your next dose  If you don't use it within 28 days, throw it away  · Measure the oral liquid medicine with a marked measuring spoon, oral syringe, or medicine cup  · This medicine should come with a Medication Guide  Ask your pharmacist for a copy if you do not have one  · Missed dose: Take a dose as soon as you remember  If it is almost time for your next dose, wait until then and take a regular dose  Do not take extra medicine to make up for a missed dose  · Store the medicine in a closed container at room temperature, away from heat, moisture, and direct light  Store the Neurontin® oral liquid in the refrigerator  Do not freeze  Drugs and Foods to Avoid:   Ask your doctor or pharmacist before using any other medicine, including over-the-counter medicines, vitamins, and herbal products  · Some medicines can affect how gabapentin works  Tell your doctor if you also use any of the following:   ¨ Hydrocodone  ¨ Morphine  · If you take an antacid, wait at least 2 hours before you take gabapentin  · Tell your doctor if you use anything else that makes you sleepy  Some examples are allergy medicine, narcotic pain medicine, and alcohol  Warnings While Using This Medicine:   · Tell your doctor if you are pregnant or breastfeeding, or if you have kidney problems or are receiving dialysis  Tell your doctor if you have a history of depression or mental health problems  · This medicine may increase depression or thoughts of suicide  Tell your doctor right away if you start to feel more depressed or think about hurting yourself    · This medicine may cause a serious allergic reaction called multiorgan hypersensitivity, which can damage organs and be life-threatening  · Do not stop using this medicine suddenly  Your doctor will need to slowly decrease your dose before you stop it completely  If you take this medicine to prevent seizures, your seizures may return or occur more often if you stop this medicine suddenly  · This medicine may make you dizzy or drowsy  Do not drive or do anything else that could be dangerous until you know how this medicine affects you  · Tell any doctor or dentist who treats you that you are using this medicine  This medicine may affect certain medical test results  · Your doctor will check your progress and the effects of this medicine at regular visits  Keep all appointments  · Keep all medicine out of the reach of children  Never share your medicine with anyone    Possible Side Effects While Using This Medicine:   Call your doctor right away if you notice any of these side effects:  · Allergic reaction: Itching or hives, swelling in your face or hands, swelling or tingling in your mouth or throat, chest tightness, trouble breathing  · Behavior problems, aggression, restlessness, trouble concentrating, moodiness (especially in children)  · Blistering, peeling, red skin rash  · Change in how much or how often you urinate, bloody or cloudy urine,  · Chest pain, fast heartbeat, trouble breathing  · Dark urine or pale stools, nausea, vomiting, loss of appetite, stomach pain, yellow skin or eyes  · Fever, rash, swollen or tender glands in the neck, armpit, or groin  · Problems with coordination, shakiness, unsteadiness  · Rapid weight gain, swelling in your hands, ankles, or feet  · Unusual moods or behaviors, thoughts of hurting yourself, feeling depressed  If you notice these less serious side effects, talk with your doctor:   · Dizziness, drowsiness, sleepiness, tiredness  If you notice other side effects that you think are caused by this medicine, tell your doctor  Call your doctor for medical advice about side effects  You may report side effects to FDA at 6-494-FDA-7443  © 2017 [address]  Information is for End User's use only and may not be sold, redistributed or otherwise used for commercial purposes  The above information is an  only  It is not intended as medical advice for individual conditions or treatments  Talk to your doctor, nurse or pharmacist before following any medical regimen to see if it is safe and effective for you  Lithium (By mouth)   Lithium (LITH-ee-um)  Treats and helps prevent manic episodes of bipolar disorder  Brand Name(s): Lith-[place], Lithate, Lithobid   There may be other brand names for this medicine  When This Medicine Should Not Be Used: This medicine is not right for everyone  Do not use it if you had an allergic reaction to lithium, or if you are pregnant or breastfeeding  Do not give the extended-release tablets to anyone younger than 15years of age  How to Use This Medicine:   Capsule, Liquid, Tablet, Long Acting Tablet  · Take your medicine as directed  Your dose may need to be changed several times to find what works best for you  · There are several different forms of lithium  The dose for each is different and they are used at different times of the day  Do not change the type of medicine you take without talking to your doctor first   · Oral liquid: Measure the oral liquid medicine with a marked measuring spoon, oral syringe, or medicine cup  · Capsule, tablet, or extended-release tablet: Swallow the medicine whole  Do not crush, break, or chew it  · Use only the brand of medicine your doctor prescribed  Other brands may not work the same way  · Missed dose: Take a dose as soon as you remember  If it is almost time for your next dose, wait until then and take a regular dose   Do not take extra medicine to make up for a missed dose   · Store the medicine in a closed container at room temperature, away from heat, moisture, and direct light  Drugs and Foods to Avoid:   Ask your doctor or pharmacist before using any other medicine, including over-the-counter medicines, vitamins, and herbal products  · Some foods and medicines can affect how this medicine works  Tell your doctor if you are using any of the following:  ¨ Acetazolamide, carbamazepine, fluoxetine, methyldopa, metronidazole, phenytoin, potassium iodide, sodium bicarbonate, theophylline  ¨ Antacid  ¨ Blood pressure medicine  ¨ Diuretic (water pill)  ¨ Medicine for depression (including paroxetine)  ¨ Medicine to treat mental illness (including haloperidol)  ¨ NSAID pain or arthritis medicine (including celecoxib, ibuprofen, indomethacin, naproxen, piroxicam)  Warnings While Using This Medicine:   · It is not safe to take this medicine during pregnancy  It could harm an unborn baby  Tell your doctor right away if you become pregnant  · Tell your doctor if you have kidney problems, heart or blood vessel disease (including Brugada syndrome), brain or nerve problems, or thyroid problems  · This medicine may cause the following problems:  ¨ Lithium toxicity  ¨ Heart problems  ¨ Kidney problems  ¨ Encephalopathic syndrome (brain problem)  · Make sure any doctor or dentist who treats you knows that you are using this medicine  · This medicine may make you dizzy or drowsy  Do not drive or do anything else that could be dangerous until you know how this medicine affects you  · Your doctor will do lab tests at regular visits to check on the effects of this medicine  Keep all appointments  · Keep all medicine out of the reach of children  Never share your medicine with anyone    Possible Side Effects While Using This Medicine:   Call your doctor right away if you notice any of these side effects:  · Allergic reaction: Itching or hives, swelling in your face or hands, swelling or tingling in your mouth or throat, chest tightness, trouble breathing  · Change in how much or how often you urinate  · Confusion, problems with walking or balance, muscle movements you cannot control  · Diarrhea, vomiting, tremors, or drowsiness  · Fast, pounding, or uneven heartbeat  · Fever  · Lightheadedness or fainting  · Unusual tiredness or weakness  If you notice these less serious side effects, talk with your doctor:   · Mild nausea  · Mild thirst  If you notice other side effects that you think are caused by this medicine, tell your doctor  Call your doctor for medical advice about side effects  You may report side effects to FDA at 0-444-FDA-3927  © 2017 [address]  Information is for End User's use only and may not be sold, redistributed or otherwise used for commercial purposes  The above information is an  only  It is not intended as medical advice for individual conditions or treatments  Talk to your doctor, nurse or pharmacist before following any medical regimen to see if it is safe and effective for you

## 2019-08-07 NOTE — PROGRESS NOTES
Daily Rounds Documentation    Team Members Present:   MD Erasmo Bennett, RN  Kleber Guo, [place]  Kalpana Deng, PharmD     Discharge to rehab or shelter today  Wants no outpatient MH tx   72 hour expires today

## 2019-08-08 ENCOUNTER — HOSPITAL ENCOUNTER (EMERGENCY)
Facility: HOSPITAL | Age: 49
Discharge: HOME | End: 2019-08-08
Attending: EMERGENCY MEDICINE | Admitting: EMERGENCY MEDICINE
Payer: MEDICARE

## 2019-08-08 VITALS
BODY MASS INDEX: 25.07 KG/M2 | HEART RATE: 89 BPM | OXYGEN SATURATION: 92 % | SYSTOLIC BLOOD PRESSURE: 118 MMHG | RESPIRATION RATE: 21 BRPM | DIASTOLIC BLOOD PRESSURE: 78 MMHG | TEMPERATURE: 98.3 F | WEIGHT: 164.9 LBS

## 2019-08-08 VITALS
BODY MASS INDEX: 24.37 KG/M2 | SYSTOLIC BLOOD PRESSURE: 107 MMHG | RESPIRATION RATE: 18 BRPM | HEART RATE: 71 BPM | WEIGHT: 160.27 LBS | DIASTOLIC BLOOD PRESSURE: 64 MMHG | TEMPERATURE: 97.8 F | OXYGEN SATURATION: 97 %

## 2019-08-08 DIAGNOSIS — F10.10 ALCOHOL ABUSE: Primary | ICD-10-CM

## 2019-08-08 LAB
AMPHETAMINES SERPL QL SCN: NEGATIVE
BARBITURATES UR QL: NEGATIVE
BENZODIAZ UR QL: POSITIVE
COCAINE UR QL: NEGATIVE
ETHANOL EXG-MCNC: 0.03 MG/DL
ETHANOL EXG-MCNC: 0.14 MG/DL
METHADONE UR QL: NEGATIVE
OPIATES UR QL SCN: NEGATIVE
PCP UR QL: NEGATIVE
THC UR QL: NEGATIVE

## 2019-08-08 PROCEDURE — 99284 EMERGENCY DEPT VISIT MOD MDM: CPT

## 2019-08-08 PROCEDURE — 82075 ASSAY OF BREATH ETHANOL: CPT | Performed by: EMERGENCY MEDICINE

## 2019-08-08 PROCEDURE — 82075 ASSAY OF BREATH ETHANOL: CPT | Performed by: PHYSICIAN ASSISTANT

## 2019-08-08 PROCEDURE — 80307 DRUG TEST PRSMV CHEM ANLYZR: CPT | Performed by: PHYSICIAN ASSISTANT

## 2019-08-08 PROCEDURE — 99282 EMERGENCY DEPT VISIT SF MDM: CPT | Performed by: PHYSICIAN ASSISTANT

## 2019-08-08 RX ORDER — ONDANSETRON 4 MG/1
4 TABLET, ORALLY DISINTEGRATING ORAL ONCE
Status: DISCONTINUED | OUTPATIENT
Start: 2019-08-08 | End: 2019-08-08 | Stop reason: HOSPADM

## 2019-08-08 RX ORDER — ONDANSETRON 4 MG/1
4 TABLET, ORALLY DISINTEGRATING ORAL ONCE
Status: COMPLETED | OUTPATIENT
Start: 2019-08-08 | End: 2019-08-08

## 2019-08-08 RX ADMIN — ONDANSETRON 4 MG: 4 TABLET, ORALLY DISINTEGRATING ORAL at 04:13

## 2019-08-08 NOTE — ED NOTES
Pt is up and talking to me but is not making any sense at all          Garcia Salcido  08/08/19 8367

## 2019-08-08 NOTE — ED NOTES
Pt found standing room removing gown and blankets  Pt yelled when told to lay back in bed, Pt stated "Fuck you motherfucker dont tell me what to do " With another RN and tech we got the pt back to bed and made pt a one-to-one observation  Provider made aware of situation       Elizabeth Yanes  08/08/19 0023

## 2019-08-08 NOTE — ED NOTES
While attempting to get an IV multiple time w/ another RN, pt would sit up and spit up clear mucous multiple times  IV attempts were unsuccessful-Dr Frankey Mole made aware       Sherren Flora  08/08/19 0020

## 2019-08-08 NOTE — ED NOTES
Pt started to yell in room, was angry about not getting food  Pt stated "Im sick of this bullshit, just get me some fucking food " With another RN and tech we redirected the pt, he calmed down and stopped yelling  Gave pt few saltine crackers and ginger ale- notified provider   Pt eating and drinking without troubles at this time     Jasmyne Chavez  08/08/19 0159

## 2019-08-08 NOTE — ED PROVIDER NOTES
History  Chief Complaint   Patient presents with    Detox Evaluation     staets he is here for rehab     Patient is a 47-year-old male who presents today requesting evaluation for rehabilitation from alcohol  Patient reports he drank 80 oz of beer an hour prior to arrival in the emergency department and presents slurring speech and sleepy  Patient reports he has had a longstanding history of alcohol abuse and reports a psychiatric history significant for bipolar 1 for which he takes lithium and gabapentin  Patient denies suicidal or homicidal ideation  Patient denies any symptoms at this time, abdominal pain, nausea, vomiting, hallucinations, headaches, lightheadedness, or tremulousness  History provided by:  Patient   used: No    Detox Evaluation   Similar prior episodes: yes    Severity:  Moderate  Onset quality:  Gradual  Duration:  1 hour  Timing:  Constant  Progression:  Unchanged  Chronicity:  Chronic  Suspected agents:  Alcohol  Risk factors: mental illness and psychiatric hx        Prior to Admission Medications   Prescriptions Last Dose Informant Patient Reported? Taking?   gabapentin (NEURONTIN) 400 mg capsule Past Month at Unknown time  No Yes   Sig: Take 2 capsules (800 mg total) by mouth 3 (three) times a day for 30 days   levothyroxine 125 mcg tablet Past Month at Unknown time  Yes Yes   Sig: Take 125 mcg by mouth   lithium carbonate (LITHOBID) 450 mg CR tablet Past Month at Unknown time  No Yes   Sig: Take 1 tablet (450 mg total) by mouth every 12 (twelve) hours for 30 days      Facility-Administered Medications: None       Past Medical History:   Diagnosis Date    Bipolar 1 disorder ([facility] [address] )        History reviewed  No pertinent surgical history  History reviewed  No pertinent family history  I have reviewed and agree with the history as documented      Social History     Tobacco Use    Smoking status: Current Every Day Smoker     Packs/day: 1 00     Types: Cigarettes  Smokeless tobacco: Never Used   Substance Use Topics    Alcohol use: Yes     Frequency: 4 or more times a week     Drinks per session: 10 or more     Binge frequency: Daily or almost daily     Comment: case daily     Drug use: Never     Comment: "I just drank"  Review of Systems   Unable to perform ROS: Other (patient is denying all complaints however is notably intoxicated)       Physical Exam  Physical Exam   Constitutional: He is oriented to person, place, and time  He appears lethargic  No distress  Disheveled, unkempt, slurring speech, sleeping / dozing off during provider exam   Eyes: EOM are normal    Neck: Normal range of motion  No JVD present  Cardiovascular: Normal rate and regular rhythm  Pulmonary/Chest: Effort normal  No respiratory distress  Abdominal: Soft  There is no tenderness  Musculoskeletal: Normal range of motion  Neurological: He is oriented to person, place, and time  He appears lethargic  He is not disoriented  GCS eye subscore is 3  GCS verbal subscore is 4  GCS motor subscore is 5  Skin: Skin is warm and dry  He is not diaphoretic         Vital Signs  ED Triage Vitals [08/08/19 1204]   Temperature Pulse Respirations Blood Pressure SpO2   97 5 °F (36 4 °C) 78 18 129/81 96 %      Temp Source Heart Rate Source Patient Position - Orthostatic VS BP Location FiO2 (%)   Tympanic Monitor Sitting Left arm --      Pain Score       --           Vitals:    08/08/19 1204 08/08/19 1453 08/08/19 1800 08/08/19 1839   BP: 129/81 100/59 103/63 107/64   Pulse: 78 71 70 71   Patient Position - Orthostatic VS: Sitting Lying Sitting Sitting         Visual Acuity      ED Medications  Medications - No data to display    Diagnostic Studies  Results Reviewed     Procedure Component Value Units Date/Time    Rapid drug screen, urine [[id number]]  (Abnormal) Collected:  08/08/19 1255    Lab Status:  Final result Specimen:  Urine, Clean Catch Updated:  08/08/19 1320     Amph/Meth UR Negative Barbiturate Ur Negative     Benzodiazepine Urine Positive     Cocaine Urine Negative     Methadone Urine Negative     Opiate Urine Negative     PCP Ur Negative     THC Urine Negative    Narrative:       Presumptive report  If requested, specimen will be sent to reference lab for confirmation  FOR MEDICAL PURPOSES ONLY  IF CONFIRMATION NEEDED PLEASE CONTACT THE LAB WITHIN 5 DAYS  Drug Screen Cutoff Levels:  AMPHETAMINE/METHAMPHETAMINES  1000 ng/mL  BARBITURATES     200 ng/mL  BENZODIAZEPINES     200 ng/mL  COCAINE      300 ng/mL  METHADONE      300 ng/mL  OPIATES      300 ng/mL  PHENCYCLIDINE     25 ng/mL  THC       50 ng/mL      POCT alcohol breath test [[id number]]  (Normal) Resulted:  08/08/19 1312    Lab Status:  Final result Updated:  08/08/19 1312     EXTBreath Alcohol 0 141                 No orders to display              Procedures  Procedures       ED Course  ED Course as of Aug 09 0848   Thu Aug 08, 2019   1319 EXTBreath Alcohol: 0 141   1704 Patient signed out to [address]  Number of Diagnoses or Management Options  Alcohol abuse:   Diagnosis management comments: Patient presents intoxicated with a representative from a rehabilitation facility requesting detox and rehab from alcohol  Patient was seen earlier today with labwork collected last night  HOST reporting this labwork is sufficient, will not obtain new bloodwork, solely a rapid urine drug screen and a breathalyzer for alcohol level  Disposition  Final diagnoses:   Alcohol abuse     Time reflects when diagnosis was documented in both MDM as applicable and the Disposition within this note     Time User Action Codes Description Comment    8/8/2019  6:40 PM Anshu Roland Add [F10 10] Alcohol abuse       ED Disposition     ED Disposition Condition Date/Time Comment    Discharge Stable Thu Aug 8, 2019  6:41 PM Chester Christian discharge to home/self care              Follow-up Information    None Discharge Medication List as of 8/8/2019  6:54 PM      CONTINUE these medications which have NOT CHANGED    Details   gabapentin (NEURONTIN) 400 mg capsule Take 2 capsules (800 mg total) by mouth 3 (three) times a day for 30 days, Starting Wed 8/7/2019, Until Fri 9/6/2019, Print      levothyroxine 125 mcg tablet Take 125 mcg by mouth, Historical Med      lithium carbonate (LITHOBID) 450 mg CR tablet Take 1 tablet (450 mg total) by mouth every 12 (twelve) hours for 30 days, Starting Wed 8/7/2019, Until Fri 9/6/2019, Print           No discharge procedures on file      ED Provider  Electronically Signed by           Kiki Dow PA-C  08/09/19 7114

## 2019-08-08 NOTE — ED PROVIDER NOTES
History  Chief Complaint   Patient presents with    Alcohol Intoxication     Patient arrived via EMS was from [facility] and is intoxicated and vomited while in the long-term cell  53-year-old male presents for evaluation of alcohol intoxication  Patient was taken to long-term after public drunkenness  EMS report that while he was in the long-term cell, he was vomiting and stumbling so medics were called to bring him to the hospital   Upon my assessment, patient is awake but not oriented  He is able to follow commands and open his eyes  He admits to drinking alcohol earlier today  Moves all 4 extremities  Per chart review, patient has been evaluated multiple times for alcohol intoxication  Prior to Admission Medications   Prescriptions Last Dose Informant Patient Reported? Taking?   gabapentin (NEURONTIN) 400 mg capsule   No No   Sig: Take 2 capsules (800 mg total) by mouth 3 (three) times a day for 30 days   levothyroxine 125 mcg tablet   Yes No   Sig: Take 125 mcg by mouth   lithium carbonate (LITHOBID) 450 mg CR tablet   No No   Sig: Take 1 tablet (450 mg total) by mouth every 12 (twelve) hours for 30 days      Facility-Administered Medications: None       Past Medical History:   Diagnosis Date    Bipolar 1 disorder ([facility] [address] )        History reviewed  No pertinent surgical history  History reviewed  No pertinent family history  I have reviewed and agree with the history as documented  Social History     Tobacco Use    Smoking status: Current Every Day Smoker     Packs/day: 1 00     Types: Cigarettes    Smokeless tobacco: Never Used   Substance Use Topics    Alcohol use: Yes     Frequency: 4 or more times a week     Drinks per session: 10 or more     Binge frequency: Daily or almost daily     Comment: case daily     Drug use: Never     Comment: "I just drank"  Review of Systems   Unable to perform ROS: Mental status change   All other systems reviewed and are negative        Physical Exam  Physical Exam   Constitutional: He appears well-developed and well-nourished  Appears intoxicated   HENT:   Head: Normocephalic and atraumatic  Eyes: Pupils are equal, round, and reactive to light  Conjunctivae and EOM are normal    Pupils 3 mm equal and reactive   Neck: Normal range of motion  Neck supple  Cardiovascular: Normal rate and regular rhythm  Pulmonary/Chest: Effort normal and breath sounds normal  No respiratory distress  He has no wheezes  He has no rales  Abdominal: Soft  Bowel sounds are normal  There is no tenderness  There is no guarding  Musculoskeletal: Normal range of motion  He exhibits no edema or tenderness  Moves all 4 extremities   Neurological: He is alert  No cranial nerve deficit  He exhibits normal muscle tone  Skin: Skin is warm  No erythema  Psychiatric: He has a normal mood and affect  His behavior is normal    Nursing note and vitals reviewed        Vital Signs  ED Triage Vitals [08/07/19 2140]   Temperature Pulse Respirations Blood Pressure SpO2   (!) 95 7 °F (35 4 °C) 69 16 (!) 123/43 95 %      Temp Source Heart Rate Source Patient Position - Orthostatic VS BP Location FiO2 (%)   Tympanic Monitor Lying Left arm --      Pain Score       No Pain           Vitals:    08/08/19 0300 08/08/19 0400 08/08/19 0532 08/08/19 0600   BP: 144/93 145/86 106/60 118/78   Pulse: 80 90 90 89   Patient Position - Orthostatic VS: Lying Lying Lying Sitting         Visual Acuity  Visual Acuity      Most Recent Value   L Pupil Size (mm)  3   R Pupil Size (mm)  3          ED Medications  Medications   sodium chloride 0 9 % bolus 1,000 mL (0 mL Intravenous Stopped 8/7/19 2312)   LORazepam (ATIVAN) 2 mg/mL injection 1 mg (1 mg Intravenous Given 8/7/19 2338)   ondansetron (ZOFRAN-ODT) dispersible tablet 4 mg (4 mg Oral Given 8/8/19 2673)       Diagnostic Studies  Results Reviewed     Procedure Component Value Units Date/Time    POCT alcohol breath test [[id number]]  (Normal) Resulted: 08/08/19 0619    Lab Status:  Final result Updated:  08/08/19 0619     EXTBreath Alcohol 0 032    CKMB [[id number]]  (Normal) Collected:  08/07/19 2151    Lab Status:  Final result Specimen:  Blood from Arm, Left Updated:  08/07/19 2225     CK-MB Index <1 0 %      CK-MB 1 7 ng/mL     Troponin I [[id number]]  (Normal) Collected:  08/07/19 2151    Lab Status:  Final result Specimen:  Blood from Arm, Left Updated:  08/07/19 2219     Troponin I 0 01 ng/mL     Narrative:       Gross Hemolysis    Comprehensive metabolic panel [[id number]]  (Abnormal) Collected:  08/07/19 2151    Lab Status:  Final result Specimen:  Blood from Arm, Left Updated:  08/07/19 2218     Sodium 143 mmol/L      Potassium 5 0 mmol/L      Chloride 102 mmol/L      CO2 30 mmol/L      ANION GAP 11 mmol/L      BUN 19 mg/dL      Creatinine 0 86 mg/dL      Glucose 81 mg/dL      Calcium 10 4 mg/dL       U/L       U/L      Alkaline Phosphatase 52 U/L      Total Protein 9 1 g/dL      Albumin 5 1 g/dL      Total Bilirubin 1 10 mg/dL      eGFR 100 ml/min/1 73sq m     Narrative:       Hemolysis  National Kidney Disease Foundation guidelines for Chronic Kidney Disease (CKD):     Stage 1 with normal or high GFR (GFR > 90 mL/min/1 73 square meters)    Stage 2 Mild CKD (GFR = 60-89 mL/min/1 73 square meters)    Stage 3A Moderate CKD (GFR = 45-59 mL/min/1 73 square meters)    Stage 3B Moderate CKD (GFR = 30-44 mL/min/1 73 square meters)    Stage 4 Severe CKD (GFR = 15-29 mL/min/1 73 square meters)    Stage 5 End Stage CKD (GFR <15 mL/min/1 73 square meters)  Note: GFR calculation is accurate only with a steady state creatinine    CK [[id number]]  (Abnormal) Collected:  08/07/19 2151    Lab Status:  Final result Specimen:  Blood from Arm, Left Updated:  08/07/19 2208     Total  U/L     Narrative:       Hemolysis    Salicylate level [[id number]]  (Abnormal) Collected:  08/07/19 2151    Lab Status:  Final result Specimen:  Blood from Arm, Left Updated:  65/72/58 7525     Salicylate Lvl <8 3 mg/dL     Narrative:       Hemolysis    Acetaminophen level-If concentration is detectable, please discuss with medical  on call  [[id number]]  (Abnormal) Collected:  08/07/19 2151    Lab Status:  Final result Specimen:  Blood from Arm, Left Updated:  08/07/19 2208     Acetaminophen Level <10 ug/mL     Narrative:       Hemolysis    Ethanol [[id number]]  (Abnormal) Collected:  08/07/19 2151    Lab Status:  Final result Specimen:  Blood from Arm, Left Updated:  08/07/19 2207     Ethanol Lvl 235 mg/dL     CBC and differential [[id number]]  (Abnormal) Collected:  08/07/19 2151    Lab Status:  Final result Specimen:  Blood from Arm, Left Updated:  08/07/19 2201     WBC 7 90 Thousand/uL      RBC 5 09 Million/uL      Hemoglobin 17 0 g/dL      Hematocrit 49 7 %      MCV 98 fL      MCH 33 4 pg      MCHC 34 3 g/dL      RDW 13 3 %      MPV 8 6 fL      Platelets 382 Thousands/uL      Neutrophils Relative 53 %      Lymphocytes Relative 38 %      Monocytes Relative 6 %      Eosinophils Relative 1 %      Basophils Relative 1 %      Neutrophils Absolute 4 20 Thousands/µL      Lymphocytes Absolute 3 00 Thousands/µL      Monocytes Absolute 0 50 Thousand/µL      Eosinophils Absolute 0 10 Thousand/µL      Basophils Absolute 0 10 Thousands/µL     Blood gas, venous [[id number]]  (Abnormal) Collected:  08/07/19 2151    Lab Status:  Final result Specimen:  Blood from Arm, Left Updated:  08/07/19 2157     pH, Broderick 7 310     pCO2, Broderick 63 0 mm Hg      pO2, Broderick 26 0 mm Hg      HCO3, Broderick 31 7 mmol/L      Base Excess, Broderick 2 9 mmol/L      O2 Content, Broderick 11 5 ml/dL      O2 HGB, VENOUS 46 1 %     Fingerstick Glucose (POCT) [[id number]]  (Normal) Collected:  08/07/19 2148    Lab Status:  Final result Updated:  08/07/19 2149     POC Glucose 82 mg/dl                  CT head wo contrast   Final Result by Payal Moore MD (08/07 2318)      No acute intracranial abnormality  Workstation performed: [id number]                    Procedures  Procedures       ED Course  ED Course as of Aug 09 1021   Wed Aug 07, 2019   2158 Procedure Note: EKG  Date/Time: 08/07/19 9:58 PM   Performed by: Hernandez Abreu  Authorized by: Hernandez Abreu  ECG interpreted by me, the ED Provider: yes   The EKG demonstrates:  Rate 65  Rhythm NSR  QTc 418  No ST elevations/depressions                                    MDM  Number of Diagnoses or Management Options  Diagnosis management comments: 47-year-old male presenting with change in mental status likely secondary to alcohol  Given limited physical exam and review of systems due to change in mental status, will obtain labs, CT head, EKG  Administer fluids  Disposition  Final diagnoses:   Alcohol intoxication ([address] )     Time reflects when diagnosis was documented in both MDM as applicable and the Disposition within this note     Time User Action Codes Description Comment    8/8/2019  6:24 AM Padmini Arnett Add [F10 929] Alcohol intoxication [facility])       ED Disposition     ED Disposition Condition Date/Time Comment    Discharge Stable Thu Aug 8, 2019  6:24 AM Kavya Ny discharge to home/self care              Follow-up Information     Follow up With Specialties Details Why [address] [place] [address] Emergency Department Emergency Medicine  If symptoms worsen [address] [address]  [address] [facility] Primary Family Medicine Schedule an appointment as soon as possible for a visit   [address] [facility] [address] [place] [address]  Salo Schroeder [facility]  [address] [address]            Discharge Medication List as of 8/8/2019  6:25 AM      CONTINUE these medications which have NOT CHANGED    Details   gabapentin (NEURONTIN) 400 mg capsule Take 2 capsules (800 mg total) by mouth 3 (three) times a day for 30 days, Starting Wed 8/7/2019, Until Fri 9/6/2019, Print      levothyroxine 125 mcg tablet Take 125 mcg by mouth, Historical Med      lithium carbonate (LITHOBID) 450 mg CR tablet Take 1 tablet (450 mg total) by mouth every 12 (twelve) hours for 30 days, Starting Wed 8/7/2019, Until Fri 9/6/2019, Print           No discharge procedures on file      ED Provider  Electronically Signed by           Regina Chavez DO  08/09/19 1020

## 2019-08-08 NOTE — ED NOTES
Pt up OOB ambulating around ED w/ ed tech- steady gait, no assistance needed  Asked pt if he wanted a sandwich before going home pt stated "Im not ready to go home, cant I stay another hour or something?"  Provider notified       Kristi Dawkins  08/08/19 9117

## 2019-08-08 NOTE — ED NOTES
Patient accepted to Saulo Hutson at [facility] transportation and paperwork  Patient ambulated out to transportation stable without signs of distress        Beatriz Mohamud RN  08/08/19 1353

## 2019-08-08 NOTE — ED CARE HANDOFF
Emergency Department Sign Out Note        Sign out and transfer of care from Dr Trang Arias  See Separate Emergency Department note  The patient, Chester Christian, was evaluated by the previous provider for ALcohol Intoxication  Workup Completed:  Bloodwork     ED Course / Workup Pending (followup): Observation until sobriety                           ED Course as of Aug 08 0636   Thu Aug 08, 2019   0224 Patient continues to be belligerent with staff, intermittently wakes up, yells profanities at staff and demands food, drink and "to take a bath "  Patient provided with water and food, spoke to the patient discussed that if he continues to speak to staff in the matter that he has been police will be contacted   Patient states that he understands and currently resting comfortably without complaints        Procedures  MDM  Number of Diagnoses or Management Options  Alcohol intoxication [facility]):   Diagnosis management comments: Patient re-evaluated, able to ambulate without assistance, blood alcohol level <0 08  No current complaints will discharge with PCP follow-up      Disposition  Final diagnoses:   Alcohol intoxication ([address] )     Time reflects when diagnosis was documented in both MDM as applicable and the Disposition within this note     Time User Action Codes Description Comment    8/8/2019  6:24 AM Micaela King Add [F10 929] Alcohol intoxication [facility])       ED Disposition     ED Disposition Condition Date/Time Comment    Discharge Stable Thu Aug 8, 2019  6:24 AM Chester Christian discharge to home/self care              Follow-up Information     Follow up With Specialties Details Why [facility] Emergency Department Emergency Medicine  If symptoms worsen [address] [phone]  [address] [place] [address] Family Medicine Schedule an appointment as soon as possible for a visit   [address] [facility] [address] [place] [address] Patient's Medications   Discharge Prescriptions    No medications on file     No discharge procedures on file         ED Provider  Electronically Signed by     Donaldo Ying MD  08/08/19 4011

## 2019-08-08 NOTE — ED NOTES
RN was notified and was able to try pt  with some gingerale  and crackers       Blayne Rosa  08/08/19 0154       Blayne Rosa  08/08/19 0157

## 2019-08-08 NOTE — ED NOTES
Radiology called they are ready for patient to do his CT scan, made them aware that our staff is attempting to re-insert another IV, the other IV infiltrated after IV fluids were running a short amount of time  Per radiology we may take him up when we are finished attempting to re-insert IV       Linh Dubose RN  08/07/19 6850

## 2019-08-08 NOTE — ED NOTES
Pt quiet/awake at this time  Pt stated "Im hungry" then rolled over and closed eyes, easily awakened if pt falls asleep  Speech still slurred and pt continues to spit white clear mucous occasionally       Theminoror Armida  08/08/19 0111

## 2019-08-08 NOTE — ED NOTES
Pt not tolerating ginger ale- throwing up now after a few sips   Provider notified       Khoa Balderas  08/08/19 1912

## 2019-08-08 NOTE — ED NOTES
Pt sat up to spit on the floor, had to redirect pt to use his bag instead         Onesimo Mixon  08/08/19 8927

## 2019-08-08 NOTE — ED NOTES
Pt states that he is in pain all over and is waiting to get a shot  All so will like to get something to eat  Pt got very mad when he was told that he was not  permited to have any food ant this time due to his upset stomach    Mark Veliz  08/08/19 0150

## 2019-08-08 NOTE — ED NOTES
Pt was trying to get out of the bed, Pt was redirected to stay in bed so he does not fall       Blayne Rosa  08/08/19 0127

## 2019-08-08 NOTE — ED NOTES
Pt was given a sandwich but said he was not ready for it right now,  I tock pt was on a walk around the ED per request by RN, pt did grate RN was notified  Upon returning to his room pt went right back to bed       Amy Michael  08/08/19 5500

## 2019-08-08 NOTE — ED NOTES
This nurse was in talking to the other nurses in the room and as I was talking his response was "shut the fuck up "  Per EMS [employer] is willing to come and pick patient up if he get's too obnoxious  Dr Gino Sanchez made aware of patient's response to this nurse       Vaibhav Campbell RN  08/07/19 5460

## 2019-08-08 NOTE — ED NOTES
With assistance from HOST, patient was accepted by [facility]    Confirmed with Jewel  Contacted [facility] for lelia for transportation  Patient is aware of plan and was cooperative

## 2019-08-08 NOTE — ED NOTES
Pt is very content now after drinking half of the ginger ale, and  said he will like to get some sleep         Ngozi Felipe  08/08/19 9059

## 2019-08-08 NOTE — ED NOTES
Multiple attempts done to get the alcohol breath test, pt stated "I'll try it later give me a few minutes " after being unable to get it for awhile  Will try again in a few minutes       Elizabeth Yanes  08/08/19 75

## 2019-08-08 NOTE — ED NOTES
Pt dressed self in own clothes even though they are wet   Pt offered paper scrubs and pt stated "No I don't want to look like a fucking idiot "      Yoan Session  08/08/19 9899

## 2019-08-08 NOTE — ED NOTES
Pt is very restless, an is not sleeping, but is laying quietly in bed at the moment       Pablo Moser  08/08/19 1239

## 2019-08-08 NOTE — ED NOTES
Pt yelling in room again- found laying facing the wall pt yelling non-sense and cursing  Provider heard- went in room to address the pt, pt stopped this behavior and is now quietly  laying in bed       Tasha Gilda  08/08/19 1700

## 2019-08-09 LAB
ATRIAL RATE: 65 BPM
P AXIS: 53 DEGREES
PR INTERVAL: 146 MS
QRS AXIS: 10 DEGREES
QRSD INTERVAL: 96 MS
QT INTERVAL: 402 MS
QTC INTERVAL: 418 MS
T WAVE AXIS: 54 DEGREES
VENTRICULAR RATE: 65 BPM

## 2019-08-09 PROCEDURE — 93010 ELECTROCARDIOGRAM REPORT: CPT | Performed by: INTERNAL MEDICINE

## 2022-10-02 NOTE — PROGRESS NOTES
[id number]  Patient's mood is labile  At beginning of shift, patient was extremely irritable, restless, and agitated  Rated his anxiety 10/10 and stated he felt angry  He was given IM Ativan and PO Haldol which then quickly calmed patient down  He napped for a short period of time and then was calm and pleasant with staff  CIWA was 7  Patient later reported mild anxiety and being unable to fall asleep  Was given PRN trazodone and Atarax at 2222  No complaints of pain  Will continue monitoring  Sola Mcdonnell(Resident)